# Patient Record
Sex: MALE | Race: OTHER
[De-identification: names, ages, dates, MRNs, and addresses within clinical notes are randomized per-mention and may not be internally consistent; named-entity substitution may affect disease eponyms.]

---

## 2018-04-13 ENCOUNTER — HOSPITAL ENCOUNTER (INPATIENT)
Dept: HOSPITAL 74 - YASAS | Age: 63
LOS: 5 days | Discharge: HOME | DRG: 773 | End: 2018-04-18
Attending: INTERNAL MEDICINE | Admitting: INTERNAL MEDICINE
Payer: SELF-PAY

## 2018-04-13 VITALS — BODY MASS INDEX: 46.9 KG/M2

## 2018-04-13 DIAGNOSIS — L97.919: ICD-10-CM

## 2018-04-13 DIAGNOSIS — G47.30: ICD-10-CM

## 2018-04-13 DIAGNOSIS — F17.210: ICD-10-CM

## 2018-04-13 DIAGNOSIS — G89.29: ICD-10-CM

## 2018-04-13 DIAGNOSIS — M54.5: ICD-10-CM

## 2018-04-13 DIAGNOSIS — B18.2: ICD-10-CM

## 2018-04-13 DIAGNOSIS — E66.01: ICD-10-CM

## 2018-04-13 DIAGNOSIS — L97.929: ICD-10-CM

## 2018-04-13 DIAGNOSIS — F19.24: ICD-10-CM

## 2018-04-13 DIAGNOSIS — F51.05: ICD-10-CM

## 2018-04-13 DIAGNOSIS — F11.23: Primary | ICD-10-CM

## 2018-04-13 LAB
APPEARANCE UR: CLEAR
BACTERIA #/AREA URNS HPF: (no result) /HPF
BILIRUB UR STRIP.AUTO-MCNC: 2 MG/DL
COLOR UR: (no result)
EPITH CASTS URNS QL MICRO: (no result) /HPF
GRAN CASTS URNS QL MICRO: 1 /LPF
HYALINE CASTS URNS QL MICRO: 11 /LPF
KETONES UR QL STRIP: NEGATIVE
LEUKOCYTE ESTERASE UR QL STRIP.AUTO: NEGATIVE
MUCOUS THREADS URNS QL MICRO: (no result)
NITRITE UR QL STRIP: NEGATIVE
PH UR: 5 [PH] (ref 5–8)
PROT UR QL STRIP: (no result)
PROT UR QL STRIP: NEGATIVE
RBC # UR STRIP: (no result) /UL
SP GR UR: 1.03 (ref 1–1.03)
UROBILINOGEN UR STRIP-MCNC: (no result) MG/DL (ref 0.2–1)

## 2018-04-13 PROCEDURE — HZ2ZZZZ DETOXIFICATION SERVICES FOR SUBSTANCE ABUSE TREATMENT: ICD-10-PCS | Performed by: INTERNAL MEDICINE

## 2018-04-13 RX ADMIN — ATORVASTATIN CALCIUM SCH MG: 10 TABLET, FILM COATED ORAL at 22:46

## 2018-04-13 RX ADMIN — AMLODIPINE BESYLATE SCH MG: 10 TABLET ORAL at 16:19

## 2018-04-13 RX ADMIN — Medication SCH MG: at 22:45

## 2018-04-13 RX ADMIN — NICOTINE SCH MG: 21 PATCH TRANSDERMAL at 16:19

## 2018-04-13 RX ADMIN — HYDROCHLOROTHIAZIDE SCH MG: 25 TABLET ORAL at 16:14

## 2018-04-13 RX ADMIN — Medication PRN MG: at 22:45

## 2018-04-13 RX ADMIN — SILVER SULFADIAZINE SCH APPLIC: 10 CREAM TOPICAL at 22:46

## 2018-04-13 NOTE — HP
COWS





- Scale


Resting Pulse: 1= RI 


Sweatin=Flushed/Facial Moisture


Restless Observation: 3= Extraneous Movement


Pupil Size: 2= Moderately Dilated


Bone or Joint Aches: 2= Severe Diffuse Aches


Runny Nose/ Eye Tearin= Runny Nose/Eyes


GI Upset > 30mins: 3= Vomiting/Diarrhea


Tremor Observation: 2= Slight Tremor Visible


Yawning Observation: 2= >3x During Session


Anxiety or Irritability: 2=Irritable/Anxious


Goose Flesh Skin: 0=Smooth Skin


COWS Score: 21





Admission ROS S





- HPI


Chief Complaint: 





I NEED HELP TO STOP USING HEROIN


Allergies/Adverse Reactions: 


 Allergies











Allergy/AdvReac Type Severity Reaction Status Date / Time


 


bee venom protein (honey bee) Allergy Severe Swelling Verified 18 13:59


 


penicillin G Allergy Severe Swelling Verified 18 13:59











History of Present Illness: 





THIS 62 YEARS OLD MALE WITH HEROIN DEPENDENCE,SEEKING DETOX,WITHDRAWAL SYMPTOM,

LAST DETOX  [facility]


HYPERTENSION NON COMPLIANCE LAST MEDICATION 1 MONTH AGO


MORBID OBESITY


CHRONIC EDEMA BOTH LEGS WITH ULCERS FOR 1 MONTH


SLEEP APNEA


DEPRESSION,INSOMNIA


LONGEST PERIOD OF SOBRIETY 15 YEARS


Exam Limitations: No Limitations





- Ebola screening


Have you traveled outside of the country in the last 21 days: No (N)


Have you had contact with anyone from an Ebola affected area: No


Have you been sick,other than usual withdrawal symptoms: No


Do you have a fever: No





- Review of Systems


Constitutional: Chills, Loss of Appetite, Malaise, Night Sweats, Changes in 

sleep, Weakness


EENT: reports: Tearing, Nose Congestion


Respiratory: reports: No Symptoms reported


Cardiac: reports: Palpitations


GI: reports: Diarrhea, Nausea, Vomiting, Abdominal cramping


: reports: No Symptoms Reported


Musculoskeletal: reports: Back Pain, Joint Pain, Muscle Pain, Joint Stiffness


Integumentary: reports: Dryness


Neuro: reports: Headache, Tremors


Endocrine: reports: No Symptoms Reported


Hematology: reports: No Symptoms Reported


Psychiatric: reports: No Sypmtoms Reported, Judgement Intact, Mood/Affect 

Appropiate, Orientated x3, Anxious, Depressed





Patient History





- Patient Medical History


Hx Anemia: No


Hx Asthma: No


Hx Chronic Obstructive Pulmonary Disease (COPD): No


Hx Cancer: No


Hx Cardiac Disorders: No


Hx Congestive Heart Failure: No


Hx Hypertension: Yes (NON COMPLIANCE)


Hx Hypercholesterolemia: No


Hx Pacemaker: No


HX Cerebrovascular Accident: No


Hx Seizures: No


Hx Diabetes: No


Hx Gastrointestinal Disorders: No


Hx Liver Disease: Yes (HEPATITIS C)


Hx Genitourinary Disorders: No


Hx Sexually Transmitted Disorders: No


Hx Renal Disease (ESRD): No


Hx Thyroid Disease: No


Hx Human Immunodeficiency Virus (HIV): No (LAST 2017 NEGATIVE)


Hx Hepatitis C: Yes


Hx Depression: No


Hx Suicide Attempt: No


Hx Bipolar Disorder: No


Hx Schizophrenia: No


Other Medical History: ANXIETY,DEPRESSION,INSOMNIA,CHRONIC EDEMA BOTH LEGS WITH 

STASIS ULCER,





- Patient Surgical History


Past Surgical History: No


Hx Neurologic Surgery: No


Hx Cataract Extraction: No


Hx Cardiac Surgery: No


Hx Lung Surgery: No


Hx Breast Surgery: No


Hx Breast Biopsy: No


Hx Abdominal Surgery: No


Hx Appendectomy: No


Hx Cholecystectomy: No


Hx Genitourinary Surgery: No


Hx  Section: No


Hx Orthopedic Surgery: No


Anesthesia Reaction: No





- PPD History


Previous Implant?: Yes


Documented Results: Negative w/o proof


Implanted On Prior R Admission?: No


PPD to be Administered?: Yes





- Smoking Cessation


Smoking history: Current every day smoker


Have you smoked in the past 12 months: Yes


Aproximately how many cigarettes per day: 10


Hx Chewing Tobacco Use: No


Initiated information on smoking cessation: Yes


'Breaking Loose' booklet given: 18





- Substance & Tx. History


Hx Alcohol Use: No


Hx Substance Use: Yes


Substance Use Type: Heroin


Hx Substance Use Treatment: Yes ([address] [place] [address])





- Substances Abused


  ** Heroin


Route: Inhalation


Frequency: Daily


Amount used: 15


Age of first use: 19


Date of Last Use: 18





Family Disease History





- Family Disease History


Family Disease History: Diabetes: Grandparent, Mother, Other: Father





Admission Physical Exam BHS





- Vital Signs


Vital Signs: 


 Vital Signs - 24 hr











  18





  11:55


 


Temperature 95.6 F L


 


Pulse Rate 92 H


 


Respiratory 20





Rate 


 


Blood Pressure 144/89














- Physical


General Appearance: Yes: Moderate Distress, Tremorous, Irritable, Sweating, 

Anxious


HEENTM: Yes: Normal ENT Inspection, Pharynx Normal


Respiratory: Yes: Within Normal Limits, Lungs Clear, No Respiratory Distress


Neck: Yes: Within Normal Limits, Supple, Trachea in good position


Breast: Yes: Within Normal Limits


Cardiology: Yes: Within Normal Limits, Regular Rhythm, Regular Rate, S1, S2


Abdominal: Yes: Within Normal Limits, Normal Bowel Sounds, Flat, Soft


Genitourinary: Yes: Within Normal Limits


Back: Yes: Muscle Spasm


Extremities: Yes: Within Normal Limits, Normal Range of Motion, Tremors, Other (

EDEMA BOTH LEGS CHRONIC BOTH LES WITH MULTILE SUPERFICIAL ULCERS DEMATITIS)


Neurological: Yes: CNs II-XII NML intact, Alert, Motor Strength 5/5, Normal Mood

/Affect


Integumentary: Yes: Dry


Lymphatic: Yes: Within Normal Limits





- Diagnostic


(1) Opioid dependence with withdrawal


Current Visit: Yes   Status: Acute   





(2) Essential hypertension


Current Visit: Yes   Status: Acute   





(3) Chronic low back pain


Current Visit: Yes   Status: Acute   





(4) Stasis leg ulcer


Current Visit: Yes   Status: Acute   


Qualifiers: 


   Laterality: bilateral   Qualified Code(s): I83.019 - Varicose veins of right 

lower extremity with ulcer of unspecified site; I83.029 - Varicose veins of 

left lower extremity with ulcer of unspecified site; L97.929 - Non-pressure 

chronic ulcer of unspecified part of left lower leg with unspecified severity; 

L97.929 - Non-pressure chronic ulcer of unspecified part of left lower leg with 

unspecified severity; I83.029 - Varicose veins of left lower extremity with 

ulcer of unspecified site; I83.029 - Varicose veins of left lower extremity 

with ulcer of unspecified site; L97.919 - Non-pressure chronic ulcer of 

unspecified part of right lower leg with unspecified severity; L97.919 - Non-

pressure chronic ulcer of unspecified part of right lower leg with unspecified 

severity; L97.919 - Non-pressure chronic ulcer of unspecified part of right 

lower leg with unspecified severity; L97.919 - Non-pressure chronic ulcer of 

unspecified part of right lower leg with unspecified severity; L97.929 - Non-

pressure chronic ulcer of unspecified part of left lower leg with unspecified 

severity; L97.929 - Non-pressure chronic ulcer of unspecified part of left 

lower leg with unspecified severity   





(5) Obesity


Current Visit: Yes   Status: Chronic   


Qualifiers: 


   Obesity type: due to excess calories   Obesity classification: adult class 3 

(BMI >= 40)   Serious obesity comorbidity presence: without serious comorbidity

   Body mass index: BMI 45.0-49.9   Qualified Code(s): E66.01 - Morbid (severe) 

obesity due to excess calories; Z68.42 - Body mass index (BMI) 45.0-49.9, adult

; Z68.42 - Body mass index (BMI) 45.0-49.9, adult; Z68.42 - Body mass index (BMI

) 45.0-49.9, adult; Z68.42 - Body mass index (BMI) 45.0-49.9, adult   





(6) Sleep apnea


Current Visit: Yes   Status: Acute   


Qualifiers: 


   Sleep apnea type: unspecified type   Qualified Code(s): G47.30 - Sleep apnea

, unspecified   





(7) Nicotine dependence


Current Visit: Yes   Status: Acute   


Qualifiers: 


   Nicotine product type: cigarettes   Substance use status: uncomplicated   

Qualified Code(s): F17.210 - Nicotine dependence, cigarettes, uncomplicated   





Cleared for Admission [facility]





- Detox or Rehab


[facility] Level of Care: Medically Managed


Detox Regimen/Protocol: Methadone





[facility] Breath Alcohol Content


Breath Alcohol Content: 0





Urine Drug Screen





- Results


Drug Screen Negative: No


Urine Drug Screen Results: OPI-Opiates, OXY-Oxycodone

## 2018-04-13 NOTE — CONSULT
[facility] Psychiatric Consult





- Data


Date of interview: 04/13/18


Admission source: [facility]


Identifying data: First admission to [facility] for this 61 y/o AA male seeking 

detox treatment on [address] for heroin dependence.Patient is a ,father of 

one,domiciled and currently employed (part-time).


Substance Abuse History: Confirmed by patient in this session.Details in 

current BHS report : Smoking history: Current every day smoker.  Have you 

smoked in the past 12 months: Yes.  Aproximately how many cigarettes per day: 

10.  Hx Chewing Tobacco Use: No.  Initiated information on smoking cessation: 

Yes.  'Breaking Loose' booklet given: 04/13/18.  - Substance & Tx. History.  Hx 

Alcohol Use: No.  Hx Substance Use: Yes.  Substance Use Type: Heroin.  Hx 

Substance Use Treatment: Yes ([address] [place] [address]).  - Substances Abused.  ** Heroin.

  Route: Inhalation.  Frequency: Daily.  Amount used: 15.  Age of first use: 

19.  Date of Last Use: 04/13/18


Medical History: Morbid obesity,hypertension,hepatitis C,sleep apnea,chronic 

back pain and bilateral leg edema (stasis ulcers).


Psychiatric History: No reported history of psychiatric 

hospitalizations.Diagnosed with MDD years ago (after the death of his wife).Not 

on psychotropic medications.Mr Kim states that he dropped out of outpatient 

psychiatric care several years ago.Denies history of suicide attempts.


Physical/Sexual Abuse/Trauma History: Traumatized by wife's death years 

ago.Couple has been together for 40 years.


Additional Comment: Urine Drug Screen Results: OPI-Opiates, OXY-Oxycodone.Noted.





Mental Status Exam





- Mental Status Exam


Alert and Oriented to: Time, Place


Cognitive Function: Good


Patient Appearance: Unkempt, Disheveled (morbidly obese,edematous legs covered 

with ulcers)


Mood: Nervous, Withdrawn, Anxious


Affect: Mood Congruent, Constricted


Patient Behavior: Fatigued, Appropriate, Cooperative


Speech Pattern: Clear, Appropriate


Voice Loudness: Normal


Thought Process: Intact, Goal Oriented


Thought Disorder: Not Present


Hallucinations: Denies


Suicidal Ideation: Denies


Homicidal Ideation: Denies


Insight/Judgement: Fair


Sleep: Well


Appetite: Good


Gait/Station: Normal (slow gait)





Psychiatric Findings





- Problem List (Axis 1, 2,3)


(1) Opioid dependence with withdrawal


Current Visit: Yes   Status: Acute   





(2) Nicotine dependence


Current Visit: Yes   Status: Acute   





(3) Substance induced mood disorder


Current Visit: Yes   Status: Acute   





- Initial Treatment Plan


Initial Treatment Plan: Psychoeducation.Detoxification in progress.Orientation 

to the unit.Support.Observation.

## 2018-04-14 LAB
ALBUMIN SERPL-MCNC: 3.7 G/DL (ref 3.4–5)
ALP SERPL-CCNC: 79 U/L (ref 45–117)
ALT SERPL-CCNC: 18 U/L (ref 12–78)
ANION GAP SERPL CALC-SCNC: 6 MMOL/L (ref 8–16)
AST SERPL-CCNC: 34 U/L (ref 15–37)
BILIRUB SERPL-MCNC: 1.5 MG/DL (ref 0.2–1)
BUN SERPL-MCNC: 16 MG/DL (ref 7–18)
CALCIUM SERPL-MCNC: 9 MG/DL (ref 8.5–10.1)
CHLORIDE SERPL-SCNC: 106 MMOL/L (ref 98–107)
CO2 SERPL-SCNC: 28 MMOL/L (ref 21–32)
CREAT SERPL-MCNC: 1.4 MG/DL (ref 0.7–1.3)
DEPRECATED RDW RBC AUTO: 17.1 % (ref 11.9–15.9)
GLUCOSE SERPL-MCNC: 91 MG/DL (ref 74–106)
HCT VFR BLD CALC: 42.5 % (ref 35.4–49)
HGB BLD-MCNC: 13.9 GM/DL (ref 11.7–16.9)
MCH RBC QN AUTO: 26.8 PG (ref 25.7–33.7)
MCHC RBC AUTO-ENTMCNC: 32.7 G/DL (ref 32–35.9)
MCV RBC: 82.1 FL (ref 80–96)
PLATELET # BLD AUTO: 110 K/MM3 (ref 134–434)
PMV BLD: 9.1 FL (ref 7.5–11.1)
POTASSIUM SERPLBLD-SCNC: 4.6 MMOL/L (ref 3.5–5.1)
PROT SERPL-MCNC: 7.1 G/DL (ref 6.4–8.2)
RBC # BLD AUTO: 5.17 M/MM3 (ref 4–5.6)
SICKLE CELL SCREEN: NEGATIVE
SODIUM SERPL-SCNC: 140 MMOL/L (ref 136–145)
WBC # BLD AUTO: 4.9 K/MM3 (ref 4–10)

## 2018-04-14 RX ADMIN — SILVER SULFADIAZINE SCH APPLIC: 10 CREAM TOPICAL at 22:52

## 2018-04-14 RX ADMIN — NICOTINE SCH MG: 21 PATCH TRANSDERMAL at 11:35

## 2018-04-14 RX ADMIN — Medication PRN MG: at 22:53

## 2018-04-14 RX ADMIN — AMLODIPINE BESYLATE SCH MG: 10 TABLET ORAL at 11:35

## 2018-04-14 RX ADMIN — ATORVASTATIN CALCIUM SCH MG: 10 TABLET, FILM COATED ORAL at 22:51

## 2018-04-14 RX ADMIN — Medication SCH TAB: at 11:35

## 2018-04-14 RX ADMIN — Medication SCH MG: at 22:51

## 2018-04-14 RX ADMIN — HYDROCHLOROTHIAZIDE SCH MG: 25 TABLET ORAL at 11:35

## 2018-04-14 RX ADMIN — SILVER SULFADIAZINE SCH APPLIC: 10 CREAM TOPICAL at 11:35

## 2018-04-14 NOTE — PN
BHS COWS





- Scale


Resting Pulse: 1= [place] 


Sweatin= Chills/Flushing


Restless Observation: 0= Sits Still


Pupil Size: 0= Normal to Room Light


Bone or Joint Aches: 4=Acute Joint/Muscle Pain


Runny Nose/ Eye Tearin= None


GI Upset > 30mins: 1= Stomach Cramp


Tremor Observation of Outstretched Hands: 0= None


Yawning Observation: 2= >3x During Session


Anxiety or Irritability: 2=Irritable/Anxious


Goose Flesh Skin: 3=Piloerection


COWS Score: 14





BHS Progress Note (SOAP)


Subjective: 





Stomach Cramping, Fatigue, Body Aches.


Objective: 


PATIENT A & O X 3. NO ACUTE DISTRESS.


PATIENT DENIES CHEST PAIN.





18 15:40


 Vital Signs











Temperature  95.8 F L  18 14:58


 


Pulse Rate  82   18 14:58


 


Respiratory Rate  20   18 14:58


 


Blood Pressure  136/84   18 14:58


 


O2 Sat by Pulse Oximetry (%)      








 Laboratory Tests











  18





  18:40 06:20 06:20


 


WBC   4.9 


 


RBC   5.17 


 


Hgb   13.9 


 


Hct   42.5 


 


MCV   82.1 


 


MCH   26.8 


 


MCHC   32.7 


 


RDW   17.1 H 


 


Plt Count   110 L 


 


MPV   9.1 


 


Manual Slide Review   No clumping seen 


 


Platelet Comment   Sl.dec 


 


Sickle Cell Screen   Negative 


 


Sodium    140


 


Potassium    4.6


 


Chloride    106


 


Carbon Dioxide    28


 


Anion Gap    6 L


 


BUN    16


 


Creatinine    1.4 H


 


Creat Clearance w eGFR    51.18


 


Random Glucose    91


 


Calcium    9.0


 


Total Bilirubin    1.5 H


 


AST    34


 


ALT    18


 


Alkaline Phosphatase    79


 


Total Protein    7.1


 


Albumin    3.7


 


Urine Color  Sada  


 


Urine Appearance  Clear  


 


Urine pH  5.0  


 


Ur Specific Gravity  1.028  


 


Urine Protein  3+ H  


 


Urine Glucose (UA)  Negative  


 


Urine Ketones  Negative  


 


Urine Blood  1+ H  


 


Urine Nitrite  Negative  


 


Urine Bilirubin  2.0  


 


Urine Urobilinogen  4.0 e.u/dl  


 


Ur Leukocyte Esterase  Negative  


 


Urine WBC (Auto)  2  


 


Urine RBC (Auto)  12  


 


Ur Epithelial Cells  Rare  


 


Urine Bacteria  Rare  


 


Hyaline Casts  11  


 


Granular Casts  1  


 


Urine Mucus  Many  


 


RPR Titer   














  18





  06:20


 


WBC 


 


RBC 


 


Hgb 


 


Hct 


 


MCV 


 


MCH 


 


MCHC 


 


RDW 


 


Plt Count 


 


MPV 


 


Manual Slide Review 


 


Platelet Comment 


 


Sickle Cell Screen 


 


Sodium 


 


Potassium 


 


Chloride 


 


Carbon Dioxide 


 


Anion Gap 


 


BUN 


 


Creatinine 


 


Creat Clearance w eGFR 


 


Random Glucose 


 


Calcium 


 


Total Bilirubin 


 


AST 


 


ALT 


 


Alkaline Phosphatase 


 


Total Protein 


 


Albumin 


 


Urine Color 


 


Urine Appearance 


 


Urine pH 


 


Ur Specific Gravity 


 


Urine Protein 


 


Urine Glucose (UA) 


 


Urine Ketones 


 


Urine Blood 


 


Urine Nitrite 


 


Urine Bilirubin 


 


Urine Urobilinogen 


 


Ur Leukocyte Esterase 


 


Urine WBC (Auto) 


 


Urine RBC (Auto) 


 


Ur Epithelial Cells 


 


Urine Bacteria 


 


Hyaline Casts 


 


Granular Casts 


 


Urine Mucus 


 


RPR Titer  Nonreactive








LABS NOTED.


Assessment: 





18 15:40


WITHDRAWAL SYMPTOMS.


Plan: 





CONTINUE DETOX.


D/C IBUPROFEN AND -MAGNESIUM-CONTAINING MEDS.

## 2018-04-15 RX ADMIN — HYDROCHLOROTHIAZIDE SCH MG: 25 TABLET ORAL at 10:50

## 2018-04-15 RX ADMIN — AMLODIPINE BESYLATE SCH MG: 10 TABLET ORAL at 10:50

## 2018-04-15 RX ADMIN — ATORVASTATIN CALCIUM SCH MG: 10 TABLET, FILM COATED ORAL at 21:54

## 2018-04-15 RX ADMIN — LISINOPRIL SCH MG: 5 TABLET ORAL at 22:59

## 2018-04-15 RX ADMIN — SILVER SULFADIAZINE SCH APPLIC: 10 CREAM TOPICAL at 10:50

## 2018-04-15 RX ADMIN — Medication SCH MG: at 21:54

## 2018-04-15 RX ADMIN — SILVER SULFADIAZINE SCH: 10 CREAM TOPICAL at 23:07

## 2018-04-15 RX ADMIN — Medication SCH TAB: at 10:50

## 2018-04-15 RX ADMIN — NICOTINE SCH MG: 21 PATCH TRANSDERMAL at 10:50

## 2018-04-15 NOTE — PN
BHS COWS





- Scale


Resting Pulse: 1= IN 


Sweatin= Chills/Flushing


Restless Observation: 1= Difficult to Sit Still


Pupil Size: 0= Normal to Room Light


Bone or Joint Aches: 2= Severe Diffuse Aches


Runny Nose/ Eye Tearin= Nasal Congestion


GI Upset > 30mins: 2= Nausea/Diarrhea


Tremor Observation of Outstretched Hands: 2= Slight Tremor Visible


Yawning Observation: 1= 1-2x During Session


Anxiety or Irritability: 2=Irritable/Anxious


Goose Flesh Skin: 3=Piloerection


COWS Score: 16





BHS Progress Note (SOAP)


Subjective: 





nausea/vomiting 


sweats


chills


interrupted sleep


Objective: 





04/15/18 11:04


 Vital Signs











Temperature  95.4 F L  04/15/18 11:00


 


Pulse Rate  89   04/15/18 11:00


 


Respiratory Rate  20   04/15/18 11:00


 


Blood Pressure  156/93   04/15/18 11:00


 


O2 Sat by Pulse Oximetry (%)      








 Laboratory Tests











  18





  18:40 06:20 06:20


 


WBC   4.9 


 


RBC   5.17 


 


Hgb   13.9 


 


Hct   42.5 


 


MCV   82.1 


 


MCH   26.8 


 


MCHC   32.7 


 


RDW   17.1 H 


 


Plt Count   110 L 


 


MPV   9.1 


 


Manual Slide Review   No clumping seen 


 


Platelet Comment   Sl.dec 


 


Sickle Cell Screen   Negative 


 


Sodium    140


 


Potassium    4.6


 


Chloride    106


 


Carbon Dioxide    28


 


Anion Gap    6 L


 


BUN    16


 


Creatinine    1.4 H


 


Creat Clearance w eGFR    51.18


 


Random Glucose    91


 


Calcium    9.0


 


Total Bilirubin    1.5 H


 


AST    34


 


ALT    18


 


Alkaline Phosphatase    79


 


Total Protein    7.1


 


Albumin    3.7


 


Urine Color  Sada  


 


Urine Appearance  Clear  


 


Urine pH  5.0  


 


Ur Specific Gravity  1.028  


 


Urine Protein  3+ H  


 


Urine Glucose (UA)  Negative  


 


Urine Ketones  Negative  


 


Urine Blood  1+ H  


 


Urine Nitrite  Negative  


 


Urine Bilirubin  2.0  


 


Urine Urobilinogen  4.0 e.u/dl  


 


Ur Leukocyte Esterase  Negative  


 


Urine WBC (Auto)  2  


 


Urine RBC (Auto)  12  


 


Ur Epithelial Cells  Rare  


 


Urine Bacteria  Rare  


 


Hyaline Casts  11  


 


Granular Casts  1  


 


Urine Mucus  Many  


 


RPR Titer   














  18





  06:20


 


WBC 


 


RBC 


 


Hgb 


 


Hct 


 


MCV 


 


MCH 


 


MCHC 


 


RDW 


 


Plt Count 


 


MPV 


 


Manual Slide Review 


 


Platelet Comment 


 


Sickle Cell Screen 


 


Sodium 


 


Potassium 


 


Chloride 


 


Carbon Dioxide 


 


Anion Gap 


 


BUN 


 


Creatinine 


 


Creat Clearance w eGFR 


 


Random Glucose 


 


Calcium 


 


Total Bilirubin 


 


AST 


 


ALT 


 


Alkaline Phosphatase 


 


Total Protein 


 


Albumin 


 


Urine Color 


 


Urine Appearance 


 


Urine pH 


 


Ur Specific Gravity 


 


Urine Protein 


 


Urine Glucose (UA) 


 


Urine Ketones 


 


Urine Blood 


 


Urine Nitrite 


 


Urine Bilirubin 


 


Urine Urobilinogen 


 


Ur Leukocyte Esterase 


 


Urine WBC (Auto) 


 


Urine RBC (Auto) 


 


Ur Epithelial Cells 


 


Urine Bacteria 


 


Hyaline Casts 


 


Granular Casts 


 


Urine Mucus 


 


RPR Titer  Nonreactive








aaox3


ambulating


no acute distress


Assessment: 





04/15/18 11:04


withdrawal sx


Plan: 





continue detox


increase fluids


tigan IM/PO prn

## 2018-04-15 NOTE — PN
BHS Progress Note


Note: 





Patient complained of nausea. Denies vomiting at this time. Ondansetron 4mg 

subligual given

## 2018-04-16 RX ADMIN — AMLODIPINE BESYLATE SCH MG: 10 TABLET ORAL at 15:01

## 2018-04-16 RX ADMIN — LISINOPRIL SCH MG: 5 TABLET ORAL at 22:25

## 2018-04-16 RX ADMIN — ATORVASTATIN CALCIUM SCH MG: 10 TABLET, FILM COATED ORAL at 22:25

## 2018-04-16 RX ADMIN — Medication PRN MG: at 22:29

## 2018-04-16 RX ADMIN — SILVER SULFADIAZINE SCH: 10 CREAM TOPICAL at 21:47

## 2018-04-16 RX ADMIN — NICOTINE SCH MG: 21 PATCH TRANSDERMAL at 12:32

## 2018-04-16 RX ADMIN — Medication SCH TAB: at 12:28

## 2018-04-16 RX ADMIN — Medication SCH MG: at 22:25

## 2018-04-16 RX ADMIN — SILVER SULFADIAZINE SCH APPLIC: 10 CREAM TOPICAL at 12:33

## 2018-04-16 RX ADMIN — HYDROCHLOROTHIAZIDE SCH MG: 25 TABLET ORAL at 12:25

## 2018-04-16 NOTE — PN
BHS Progress Note


Note: 





 Vital Signs











Temperature  97 F L  04/16/18 14:01


 


Pulse Rate  90   04/16/18 14:01


 


Respiratory Rate  20   04/16/18 14:01


 


Blood Pressure  166/103   04/16/18 14:01


 


O2 Sat by Pulse Oximetry (%)      








Patient with symptomatic elevated BP 





one time dose clonidine 0.1mg STAT 


Increase fluids 


Continue to monitor

## 2018-04-16 NOTE — EKG
Test Reason : 

Blood Pressure : ***/*** mmHG

Vent. Rate : 085 BPM     Atrial Rate : 085 BPM

   P-R Int : 142 ms          QRS Dur : 086 ms

    QT Int : 372 ms       P-R-T Axes : 040 011 217 degrees

   QTc Int : 442 ms

 

NORMAL SINUS RHYTHM

NONSPECIFIC T WAVE ABNORMALITY

ABNORMAL ECG

NO PREVIOUS ECGS AVAILABLE

Confirmed by DERREK GARCÍA MD ([id number]) on 4/16/2018 12:43:28 PM

 

Referred By:             Confirmed By:DERREK GARCÍA MD

## 2018-04-16 NOTE — PN
BHS Progress Note (SOAP)


Subjective: 





Fatigue, Body Aches, Interrupted Sleep, Vomiting.


Objective: 


PATIENT A & O X 3. NO ACUTE DISTRESS.





04/16/18 13:29


 Vital Signs











Temperature  96.3 F L  04/16/18 09:24


 


Pulse Rate  91 H  04/16/18 09:24


 


Respiratory Rate  18   04/16/18 09:24


 


Blood Pressure  166/107   04/16/18 09:24


 


O2 Sat by Pulse Oximetry (%)      








 Laboratory Tests











  04/13/18 04/14/18 04/14/18





  18:40 06:20 06:20


 


WBC   4.9 


 


RBC   5.17 


 


Hgb   13.9 


 


Hct   42.5 


 


MCV   82.1 


 


MCH   26.8 


 


MCHC   32.7 


 


RDW   17.1 H 


 


Plt Count   110 L 


 


MPV   9.1 


 


Manual Slide Review   No clumping seen 


 


Platelet Comment   Sl.dec 


 


Sickle Cell Screen   Negative 


 


Sodium    140


 


Potassium    4.6


 


Chloride    106


 


Carbon Dioxide    28


 


Anion Gap    6 L


 


BUN    16


 


Creatinine    1.4 H


 


Creat Clearance w eGFR    51.18


 


Random Glucose    91


 


Calcium    9.0


 


Total Bilirubin    1.5 H


 


AST    34


 


ALT    18


 


Alkaline Phosphatase    79


 


Total Protein    7.1


 


Albumin    3.7


 


Urine Color  Sada  


 


Urine Appearance  Clear  


 


Urine pH  5.0  


 


Ur Specific Gravity  1.028  


 


Urine Protein  3+ H  


 


Urine Glucose (UA)  Negative  


 


Urine Ketones  Negative  


 


Urine Blood  1+ H  


 


Urine Nitrite  Negative  


 


Urine Bilirubin  2.0  


 


Urine Urobilinogen  4.0 e.u/dl  


 


Ur Leukocyte Esterase  Negative  


 


Urine WBC (Auto)  2  


 


Urine RBC (Auto)  12  


 


Ur Epithelial Cells  Rare  


 


Urine Bacteria  Rare  


 


Hyaline Casts  11  


 


Granular Casts  1  


 


Urine Mucus  Many  


 


RPR Titer   


 


HIV 1&2 Antibody Screen   


 


HIV P24 Antigen   














  04/14/18 04/15/18





  06:20 08:00


 


WBC  


 


RBC  


 


Hgb  


 


Hct  


 


MCV  


 


MCH  


 


MCHC  


 


RDW  


 


Plt Count  


 


MPV  


 


Manual Slide Review  


 


Platelet Comment  


 


Sickle Cell Screen  


 


Sodium  


 


Potassium  


 


Chloride  


 


Carbon Dioxide  


 


Anion Gap  


 


BUN  


 


Creatinine  


 


Creat Clearance w eGFR  


 


Random Glucose  


 


Calcium  


 


Total Bilirubin  


 


AST  


 


ALT  


 


Alkaline Phosphatase  


 


Total Protein  


 


Albumin  


 


Urine Color  


 


Urine Appearance  


 


Urine pH  


 


Ur Specific Gravity  


 


Urine Protein  


 


Urine Glucose (UA)  


 


Urine Ketones  


 


Urine Blood  


 


Urine Nitrite  


 


Urine Bilirubin  


 


Urine Urobilinogen  


 


Ur Leukocyte Esterase  


 


Urine WBC (Auto)  


 


Urine RBC (Auto)  


 


Ur Epithelial Cells  


 


Urine Bacteria  


 


Hyaline Casts  


 


Granular Casts  


 


Urine Mucus  


 


RPR Titer  Nonreactive 


 


HIV 1&2 Antibody Screen   Negative


 


HIV P24 Antigen   Negative








LABS NOTED.


Assessment: 





04/16/18 13:30


WITHDRAWAL SYMPTOMS.


Plan: 





CONTINUE DETOX.


AMLODIPINE, 10 MG PO DAILY FOR ELEVATED BP (PATIENT WAS PRESCRIBED PRIOR TO 

DETOX ADMISSION).

## 2018-04-16 NOTE — EKG
Test Reason : 

Blood Pressure : ***/*** mmHG

Vent. Rate : 080 BPM     Atrial Rate : 080 BPM

   P-R Int : 144 ms          QRS Dur : 084 ms

    QT Int : 400 ms       P-R-T Axes : 040 -16 -16 degrees

   QTc Int : 461 ms

 

NORMAL SINUS RHYTHM

NONSPECIFIC T WAVE ABNORMALITY

PROLONGED QT

ABNORMAL ECG

WHEN COMPARED WITH ECG OF 13-APR-2018 17:03,

NO SIGNIFICANT CHANGE WAS FOUND

Confirmed by DERREK GARCÍA MD ([id number]) on 4/16/2018 12:42:01 PM

 

Referred By:             Confirmed By:DERREK GARCÍA MD

## 2018-04-17 RX ADMIN — NICOTINE SCH MG: 21 PATCH TRANSDERMAL at 10:37

## 2018-04-17 RX ADMIN — AMLODIPINE BESYLATE SCH MG: 10 TABLET ORAL at 10:38

## 2018-04-17 RX ADMIN — SILVER SULFADIAZINE SCH APPLIC: 10 CREAM TOPICAL at 23:17

## 2018-04-17 RX ADMIN — SILVER SULFADIAZINE SCH APPLIC: 10 CREAM TOPICAL at 10:37

## 2018-04-17 RX ADMIN — Medication SCH TAB: at 10:38

## 2018-04-17 RX ADMIN — Medication PRN MG: at 23:12

## 2018-04-17 RX ADMIN — HYDROCHLOROTHIAZIDE SCH MG: 25 TABLET ORAL at 10:38

## 2018-04-17 RX ADMIN — Medication SCH MG: at 23:12

## 2018-04-17 RX ADMIN — LISINOPRIL SCH MG: 5 TABLET ORAL at 23:12

## 2018-04-17 RX ADMIN — ATORVASTATIN CALCIUM SCH MG: 10 TABLET, FILM COATED ORAL at 23:12

## 2018-04-17 NOTE — PN
[facility] Progress Note (SOAP)


Subjective: 





Fatigue, Vomiting, Stomach Cramping, Body Aches.


Objective: 


PATIENT A & O X 3. NO ACUTE DISTRESS.





04/17/18 13:58


 Vital Signs











Temperature  96.3 F L  04/17/18 09:06


 


Pulse Rate  86   04/17/18 09:06


 


Respiratory Rate  18   04/17/18 09:06


 


Blood Pressure  156/101   04/17/18 09:06


 


O2 Sat by Pulse Oximetry (%)      








 Laboratory Tests











  04/13/18 04/14/18 04/14/18





  18:40 06:20 06:20


 


WBC   4.9 


 


RBC   5.17 


 


Hgb   13.9 


 


Hct   42.5 


 


MCV   82.1 


 


MCH   26.8 


 


MCHC   32.7 


 


RDW   17.1 H 


 


Plt Count   110 L 


 


MPV   9.1 


 


Manual Slide Review   No clumping seen 


 


Platelet Comment   Sl.dec 


 


Sickle Cell Screen   Negative 


 


Sodium    140


 


Potassium    4.6


 


Chloride    106


 


Carbon Dioxide    28


 


Anion Gap    6 L


 


BUN    16


 


Creatinine    1.4 H


 


Creat Clearance w eGFR    51.18


 


Random Glucose    91


 


Calcium    9.0


 


Total Bilirubin    1.5 H


 


AST    34


 


ALT    18


 


Alkaline Phosphatase    79


 


Total Protein    7.1


 


Albumin    3.7


 


Urine Color  Sada  


 


Urine Appearance  Clear  


 


Urine pH  5.0  


 


Ur Specific Gravity  1.028  


 


Urine Protein  3+ H  


 


Urine Glucose (UA)  Negative  


 


Urine Ketones  Negative  


 


Urine Blood  1+ H  


 


Urine Nitrite  Negative  


 


Urine Bilirubin  2.0  


 


Urine Urobilinogen  4.0 e.u/dl  


 


Ur Leukocyte Esterase  Negative  


 


Urine WBC (Auto)  2  


 


Urine RBC (Auto)  12  


 


Ur Epithelial Cells  Rare  


 


Urine Bacteria  Rare  


 


Hyaline Casts  11  


 


Granular Casts  1  


 


Urine Mucus  Many  


 


RPR Titer   


 


HIV 1&2 Antibody Screen   


 


HIV P24 Antigen   














  04/14/18 04/15/18





  06:20 08:00


 


WBC  


 


RBC  


 


Hgb  


 


Hct  


 


MCV  


 


MCH  


 


MCHC  


 


RDW  


 


Plt Count  


 


MPV  


 


Manual Slide Review  


 


Platelet Comment  


 


Sickle Cell Screen  


 


Sodium  


 


Potassium  


 


Chloride  


 


Carbon Dioxide  


 


Anion Gap  


 


BUN  


 


Creatinine  


 


Creat Clearance w eGFR  


 


Random Glucose  


 


Calcium  


 


Total Bilirubin  


 


AST  


 


ALT  


 


Alkaline Phosphatase  


 


Total Protein  


 


Albumin  


 


Urine Color  


 


Urine Appearance  


 


Urine pH  


 


Ur Specific Gravity  


 


Urine Protein  


 


Urine Glucose (UA)  


 


Urine Ketones  


 


Urine Blood  


 


Urine Nitrite  


 


Urine Bilirubin  


 


Urine Urobilinogen  


 


Ur Leukocyte Esterase  


 


Urine WBC (Auto)  


 


Urine RBC (Auto)  


 


Ur Epithelial Cells  


 


Urine Bacteria  


 


Hyaline Casts  


 


Granular Casts  


 


Urine Mucus  


 


RPR Titer  Nonreactive 


 


HIV 1&2 Antibody Screen   Negative


 


HIV P24 Antigen   Negative








LABS NOTED.


Assessment: 





04/17/18 13:59


WITHDRAWAL SYMPTOMS.


Plan: 





CONTINUE DETOX.


CLONIDINE, 0.2 MG PO X 1 FOR DETOX SYMPTOMS AND FOR ELEVATED BP.


PATIENT HAS DISPLAYED VERY LITTLE AMBULATION DURING THIS DETOX ADMISSION. 

PATIENT ENCOURAGED TO ATTEMPT AMBULATION AS TOLERATED AS HE PREPARES FOR 

DISCHARGE TOMORROW.


PATIENT ALSO ADVISED TO FOLLOW-UP WITH  DR. THOMAS ([place] 

AFTER DISCHARGE FROM DETOX FOR MEDICAL ASSESSMENT, FOR WOUNDS ON FEET, FOR 

HISTORY OF HYPERTENSION, AND FOR ABNORMAL RENAL VALUES DRAWN WHILE ADMITTED FOR 

DETOX.

## 2018-04-18 VITALS — DIASTOLIC BLOOD PRESSURE: 78 MMHG | HEART RATE: 78 BPM | TEMPERATURE: 96.3 F | SYSTOLIC BLOOD PRESSURE: 125 MMHG

## 2018-04-18 NOTE — PN
[facility]HS Progress Note (SOAP)


Subjective: 





Patient reports that he feels well overall.


Objective: 


PATIENT A & O X 3, OBSERVED AMBULATING ON UNIT. NO ACUTE DISTRESS.





04/18/18 15:01


 Vital Signs











Temperature  96.3 F L  04/18/18 09:05


 


Pulse Rate  78   04/18/18 09:05


 


Respiratory Rate  18   04/18/18 09:05


 


Blood Pressure  125/78   04/18/18 09:05


 


O2 Sat by Pulse Oximetry (%)      








 Laboratory Tests











  04/13/18 04/14/18 04/14/18





  18:40 06:20 06:20


 


WBC   4.9 


 


RBC   5.17 


 


Hgb   13.9 


 


Hct   42.5 


 


MCV   82.1 


 


MCH   26.8 


 


MCHC   32.7 


 


RDW   17.1 H 


 


Plt Count   110 L 


 


MPV   9.1 


 


Manual Slide Review   No clumping seen 


 


Platelet Comment   Sl.dec 


 


Sickle Cell Screen   Negative 


 


Sodium    140


 


Potassium    4.6


 


Chloride    106


 


Carbon Dioxide    28


 


Anion Gap    6 L


 


BUN    16


 


Creatinine    1.4 H


 


Creat Clearance w eGFR    51.18


 


Random Glucose    91


 


Calcium    9.0


 


Total Bilirubin    1.5 H


 


AST    34


 


ALT    18


 


Alkaline Phosphatase    79


 


Total Protein    7.1


 


Albumin    3.7


 


Urine Color  Sada  


 


Urine Appearance  Clear  


 


Urine pH  5.0  


 


Ur Specific Gravity  1.028  


 


Urine Protein  3+ H  


 


Urine Glucose (UA)  Negative  


 


Urine Ketones  Negative  


 


Urine Blood  1+ H  


 


Urine Nitrite  Negative  


 


Urine Bilirubin  2.0  


 


Urine Urobilinogen  4.0 e.u/dl  


 


Ur Leukocyte Esterase  Negative  


 


Urine WBC (Auto)  2  


 


Urine RBC (Auto)  12  


 


Ur Epithelial Cells  Rare  


 


Urine Bacteria  Rare  


 


Hyaline Casts  11  


 


Granular Casts  1  


 


Urine Mucus  Many  


 


RPR Titer   


 


HIV 1&2 Antibody Screen   


 


HIV P24 Antigen   














  04/14/18 04/15/18





  06:20 08:00


 


WBC  


 


RBC  


 


Hgb  


 


Hct  


 


MCV  


 


MCH  


 


MCHC  


 


RDW  


 


Plt Count  


 


MPV  


 


Manual Slide Review  


 


Platelet Comment  


 


Sickle Cell Screen  


 


Sodium  


 


Potassium  


 


Chloride  


 


Carbon Dioxide  


 


Anion Gap  


 


BUN  


 


Creatinine  


 


Creat Clearance w eGFR  


 


Random Glucose  


 


Calcium  


 


Total Bilirubin  


 


AST  


 


ALT  


 


Alkaline Phosphatase  


 


Total Protein  


 


Albumin  


 


Urine Color  


 


Urine Appearance  


 


Urine pH  


 


Ur Specific Gravity  


 


Urine Protein  


 


Urine Glucose (UA)  


 


Urine Ketones  


 


Urine Blood  


 


Urine Nitrite  


 


Urine Bilirubin  


 


Urine Urobilinogen  


 


Ur Leukocyte Esterase  


 


Urine WBC (Auto)  


 


Urine RBC (Auto)  


 


Ur Epithelial Cells  


 


Urine Bacteria  


 


Hyaline Casts  


 


Granular Casts  


 


Urine Mucus  


 


RPR Titer  Nonreactive 


 


HIV 1&2 Antibody Screen   Negative


 


HIV P24 Antigen   Negative








LABS NOTED.


Assessment: 





04/18/18 15:02


COMPLETION OF DETOX REGIMEN.


Plan: 





PATIENT SCHEDULED FOR DISCHARGE FROM DETOX UNIT TODAY.

## 2018-11-13 ENCOUNTER — HOSPITAL ENCOUNTER (INPATIENT)
Dept: HOSPITAL 74 - YASAS | Age: 63
LOS: 2 days | Discharge: ANOTHER HOSPITAL | DRG: 773 | End: 2018-11-15
Attending: INTERNAL MEDICINE | Admitting: INTERNAL MEDICINE
Payer: COMMERCIAL

## 2018-11-13 VITALS — BODY MASS INDEX: 44.3 KG/M2

## 2018-11-13 DIAGNOSIS — Z88.0: ICD-10-CM

## 2018-11-13 DIAGNOSIS — G89.29: ICD-10-CM

## 2018-11-13 DIAGNOSIS — M54.5: ICD-10-CM

## 2018-11-13 DIAGNOSIS — B18.2: ICD-10-CM

## 2018-11-13 DIAGNOSIS — F11.23: Primary | ICD-10-CM

## 2018-11-13 DIAGNOSIS — F17.210: ICD-10-CM

## 2018-11-13 DIAGNOSIS — I10: ICD-10-CM

## 2018-11-13 PROCEDURE — HZ2ZZZZ DETOXIFICATION SERVICES FOR SUBSTANCE ABUSE TREATMENT: ICD-10-PCS | Performed by: INTERNAL MEDICINE

## 2018-11-13 RX ADMIN — NICOTINE SCH MG: 7 PATCH TRANSDERMAL at 17:36

## 2018-11-13 RX ADMIN — HYDROCHLOROTHIAZIDE SCH MG: 12.5 CAPSULE ORAL at 17:36

## 2018-11-13 RX ADMIN — Medication SCH MG: at 22:42

## 2018-11-13 RX ADMIN — AMLODIPINE BESYLATE SCH MG: 10 TABLET ORAL at 17:36

## 2018-11-13 NOTE — HP
COWS





- Scale


Resting Pulse: 2= -120


Sweatin= No chills or Flushing


Restless Observation: 0= Sits Still


Pupil Size: 0= Normal to Room Light


Bone or Joint Aches: 2= Severe Diffuse Aches


Runny Nose/ Eye Tearin= Runny Nose/Eyes


GI Upset > 30mins: 0= None


Tremor Observation: 1= Tremor Felt, Not Seen


Yawning Observation: 0= None


Anxiety or Irritability: 0= None


Goose Flesh Skin: 0=Smooth Skin


COWS Score: 7





CIWA Score





- Admission Criteria


[facility] Guidelines: Admission for Medically Managed Detox: 


Requires at least one of the followin. CIWA greater than 12


2. Seizures within the past 24 hours


3. Delirium tremens within the past 24 hours


4. Hallucinations within the past 24 hours


5. Acute intervention needed for co  occurring medical disorder


6. Acute intervention needed for co  occurring psychiatric disorder


7. Severe withdrawal that cannot be handled at a lower level of care (continued


    vomiting, continued diarrhea, abnormal vital signs) requiring intravenous


    medication and/or fluids


8. Pregnancy








Admission ROS [facility]





- [facility]


Allergies/Adverse Reactions: 


 Allergies











Allergy/AdvReac Type Severity Reaction Status Date / Time


 


bee venom protein (honey bee) Allergy Severe Swelling Verified 18 12:01


 


penicillin G Allergy Severe Swelling Verified 18 12:01











History of Present Illness: 





pt here requesting detox from opiate use  , reports 4-5 bags/day via inhalation 

, denies Ivdu  currently + ivdu 20 years ago , first age of use since 1970's , 

most recent use yesterday 12 noon , current symptoms as above ,  in outpt 

program [facility] , referred  by counsellor  to this facility due to ongoing use  

. 


denies other  illicits or ETOH currently , in the past  reports use of  cocaine 

(25 years ago ).


tobacco : 1 ppd , requesting nrt w/ patch 


utox +  fen, opi, oxy , bzo 


caitlin  0.000  


pmhx : goes  to [facility] for swelling in legs , HTN , meds as  

below 


meds - brought in dated 8/15/18  HCTZ 12.5 mg Amlodipine 10 mg 


pshx : arm abscess ( many years ago )


psych : denies  


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 





- Ebola screening


Have you traveled outside of the country in the last 21 days: No


Have you had contact with anyone from an Ebola affected area: No


Have you been sick,other than usual withdrawal symptoms: No


Do you have a fever: No





- Review of Systems


Constitutional: See HPI


EENT: reports: See HPI, Other (glasses  - bifocals)


Respiratory: reports: No Symptoms reported


Cardiac: reports: No Symptoms Reported


GI: reports: No Symptoms Reported


: reports: No Symptoms Reported


Musculoskeletal: reports: Back Pain, Muscle Pain


Integumentary: reports: No Symptoms Reported


Neuro: reports: No Symptoms reported


Endocrine: reports: No Symptoms Reported


Psychiatric: reports: Judgement Intact, Orientated x3





Patient History





- Patient Medical History


Hx Anemia: No


Hx Asthma: No


Hx Chronic Obstructive Pulmonary Disease (COPD): No


Hx Cancer: No


Hx Cardiac Disorders: No


Hx Congestive Heart Failure: No


Hx Hypertension: Yes (On meds)


Hx Hypercholesterolemia: No


Hx Pacemaker: No


HX Cerebrovascular Accident: No


Hx Seizures: No


Hx Diabetes: No


Hx Gastrointestinal Disorders: No


Hx Liver Disease: Yes (HEPATITIS C)


Hx Genitourinary Disorders: No


Hx Sexually Transmitted Disorders: No


Hx Renal Disease (ESRD): No


Hx Thyroid Disease: No


Hx Human Immunodeficiency Virus (HIV): No (LAST 2017 NEGATIVE)


Hx Hepatitis C: Yes


Hx Depression: No


Hx Suicide Attempt: No


Hx Bipolar Disorder: No


Hx Schizophrenia: No





- Patient Surgical History


Past Surgical History: No


Hx Neurologic Surgery: No


Hx Cataract Extraction: No


Hx Cardiac Surgery: No


Hx Lung Surgery: No


Hx Breast Surgery: No


Hx Breast Biopsy: No


Hx Abdominal Surgery: No


Hx Appendectomy: No


Hx Cholecystectomy: No


Hx Genitourinary Surgery: No


Hx  Section: No


Hx Orthopedic Surgery: No


Anesthesia Reaction: No





- PPD History


Previous Implant?: Yes


Documented Results: Negative w/proof


Date: 04/15/18





- Smoking Cessation


Smoking history: Current every day smoker


Have you smoked in the past 12 months: Yes


Aproximately how many cigarettes per day: 10


Hx Chewing Tobacco Use: No


Initiated information on smoking cessation: No





- Substances Abused


  ** Heroin


Route: Inhalation


Frequency: 3-6 times per week


Amount used: 4-5 bags


Age of first use: 27


Date of Last Use: 17





Family Disease History





- Family Disease History


Family Disease History: Diabetes: Grandparent, Mother, Other: Father





Admission Physical Exam B[facility]





- Vital Signs


Vital Signs: 


 Vital Signs - 24 hr











  18





  11:40


 


Temperature 97.2 F L


 


Pulse Rate 102 H


 


Respiratory 18





Rate 


 


Blood Pressure 165/102 H














- Physical


General Appearance: Yes: Disheveled, Mild Distress


HEENTM: Yes: EOMI, Hearing grossly Normal, Normal ENT Inspection, Normocephalic

, Normal Voice, Pharynx Normal, Nasal Congestion, Rhinorrhea, Other (poor 

dentition, many missing teeth)


Respiratory: Yes: Chest Non-Tender, Lungs Clear, Normal Breath Sounds


Neck: Yes: No masses,lesions,Nodules, Trachea in good position


Breast: Yes: Breast Exam Deferred


Cardiology: Yes: Regular Rhythm, Regular Rate, Tachycardia


Abdominal: Yes: Normal Bowel Sounds, Non Tender, Protuberent


Genitourinary: Yes: Within Normal Limits


Back: Yes: Normal Inspection


Musculoskeletal: Yes: full range of Motion, Gait Steady, Back pain, Muscle Pain


Extremities: Yes: Swelling, Erythema, Inflammation, Other (severe edema 

bilateral LE  w/ induration , dry skin , hyperkeratotic plaques . No open areas

  noted , patient states  swelling is better than  since he was at [facility], however admits to not taking BP meds regularly  . " it gets better 

when I put my feet up  , I haven't been doing that " . No pain , negative Homans

'.)


Neurological: Yes: Motor Strength 5/5, Normal Mood/Affect, Normal Response


Integumentary: Yes: Normal Color, Dry, Warm





- Diagnostic


(1) Chronic low back pain


Current Visit: No   Status: Chronic   


Qualifiers: 


   Back pain laterality: unspecified   Sciatica presence: unspecified whether 

sciatica present   Qualified Code(s): M54.5 - Low back pain; G89.29 - Other 

chronic pain; G89.29 - Other chronic pain   





(2) Essential hypertension


Current Visit: No   Status: Chronic   





(3) Nicotine dependence


Current Visit: No   Status: Acute   


Qualifiers: 


   Nicotine product type: cigarettes   Substance use status: uncomplicated   

Qualified Code(s): F17.210 - Nicotine dependence, cigarettes, uncomplicated   





(4) Opioid dependence with withdrawal


Current Visit: No   Status: Acute   





BHS Breath Alcohol Content


Breath Alcohol Content: 0





Urine Drug Screen





- Results


Drug Screen Negative: No


Urine Drug Screen Results: OPI-Opiates, BZO-Benzodiazepines, OXY-Oxycodone, FEN-

Fentanyl

## 2018-11-14 LAB
ALBUMIN SERPL-MCNC: 3.5 G/DL (ref 3.4–5)
ALP SERPL-CCNC: 84 U/L (ref 45–117)
ALT SERPL-CCNC: 37 U/L (ref 13–61)
ANION GAP SERPL CALC-SCNC: 10 MMOL/L (ref 8–16)
APPEARANCE UR: CLEAR
AST SERPL-CCNC: 45 U/L (ref 15–37)
BILIRUB SERPL-MCNC: 1.1 MG/DL (ref 0.2–1)
BILIRUB UR STRIP.AUTO-MCNC: NEGATIVE MG/DL
BUN SERPL-MCNC: 10 MG/DL (ref 7–18)
CALCIUM SERPL-MCNC: 9.1 MG/DL (ref 8.5–10.1)
CHLORIDE SERPL-SCNC: 105 MMOL/L (ref 98–107)
CO2 SERPL-SCNC: 27 MMOL/L (ref 21–32)
COLOR UR: YELLOW
CREAT SERPL-MCNC: 1.1 MG/DL (ref 0.55–1.3)
DEPRECATED RDW RBC AUTO: 15.6 % (ref 11.9–15.9)
GLUCOSE SERPL-MCNC: 91 MG/DL (ref 74–106)
HCT VFR BLD CALC: 46.9 % (ref 35.4–49)
HGB BLD-MCNC: 15.2 GM/DL (ref 11.7–16.9)
KETONES UR QL STRIP: NEGATIVE
LEUKOCYTE ESTERASE UR QL STRIP.AUTO: NEGATIVE
MCH RBC QN AUTO: 27.7 PG (ref 25.7–33.7)
MCHC RBC AUTO-ENTMCNC: 32.5 G/DL (ref 32–35.9)
MCV RBC: 85.3 FL (ref 80–96)
NITRITE UR QL STRIP: NEGATIVE
PH UR: 5 [PH] (ref 5–8)
PLATELET # BLD AUTO: 132 K/MM3 (ref 134–434)
PMV BLD: 7.8 FL (ref 7.5–11.1)
POTASSIUM SERPLBLD-SCNC: 4.5 MMOL/L (ref 3.5–5.1)
PROT SERPL-MCNC: 6.9 G/DL (ref 6.4–8.2)
PROT UR QL STRIP: NEGATIVE
PROT UR QL STRIP: NEGATIVE
RBC # BLD AUTO: 5.5 M/MM3 (ref 4–5.6)
SODIUM SERPL-SCNC: 142 MMOL/L (ref 136–145)
SP GR UR: 1.02 (ref 1.01–1.03)
UROBILINOGEN UR STRIP-MCNC: NEGATIVE MG/DL (ref 0.2–1)
WBC # BLD AUTO: 5.4 K/MM3 (ref 4–10)

## 2018-11-14 RX ADMIN — AMLODIPINE BESYLATE SCH MG: 10 TABLET ORAL at 10:24

## 2018-11-14 RX ADMIN — ONDANSETRON PRN MG: 4 TABLET, ORALLY DISINTEGRATING ORAL at 11:40

## 2018-11-14 RX ADMIN — NICOTINE SCH MG: 7 PATCH TRANSDERMAL at 10:24

## 2018-11-14 RX ADMIN — Medication SCH TAB: at 10:24

## 2018-11-14 RX ADMIN — Medication SCH MG: at 21:43

## 2018-11-14 RX ADMIN — HYDROCHLOROTHIAZIDE SCH MG: 12.5 CAPSULE ORAL at 10:24

## 2018-11-14 RX ADMIN — ONDANSETRON PRN MG: 4 TABLET, ORALLY DISINTEGRATING ORAL at 21:48

## 2018-11-14 NOTE — PN
BHS COWS





- Scale


Resting Pulse: 1= [place] 


Sweatin= Chills/Flushing


Restless Observation: 3= Extraneous Movement


Pupil Size: 0= Normal to Room Light


Bone or Joint Aches: 2= Severe Diffuse Aches


Runny Nose/ Eye Tearin= Runny Nose/Eyes


GI Upset > 30mins: 1= Stomach Cramp


Tremor Observation of Outstretched Hands: 2= Slight Tremor Visible


Yawning Observation: 1= 1-2x During Session


Anxiety or Irritability: 2=Irritable/Anxious


Goose Flesh Skin: 0=Smooth Skin


COWS Score: 15





BHS Progress Note (SOAP)


Subjective: 





Stomach ache, diarrhea, anxious, nauseous


Objective: 





18 11:44


 Last Vital Signs











Temp Pulse Resp BP Pulse Ox


 


 98.4 F   96 H  18   174/94 H   


 


 18 09:03  18 09:03  18 09:03  18 09:03   








B/P noted (has htn, on medications)





 Laboratory Tests











  18





  23:11 07:17 07:17


 


WBC   5.4 


 


RBC   5.50 


 


Hgb   15.2 


 


Hct   46.9 


 


MCV   85.3 


 


MCH   27.7 


 


MCHC   32.5 


 


RDW   15.6 


 


Plt Count   132 L 


 


MPV   7.8  D 


 


Sodium    142


 


Potassium    4.5


 


Chloride    105


 


Carbon Dioxide    27


 


Anion Gap    10


 


BUN    10


 


Creatinine    1.1


 


Creat Clearance w eGFR    > 60


 


Random Glucose    91


 


Calcium    9.1


 


Total Bilirubin    1.1 H


 


AST    45 H


 


ALT    37


 


Alkaline Phosphatase    84


 


Total Protein    6.9


 


Albumin    3.5


 


Urine Color  Yellow  


 


Urine Appearance  Clear  


 


Urine pH  5.0  


 


Ur Specific Gravity  1.019  


 


Urine Protein  Negative  


 


Urine Glucose (UA)  Negative  


 


Urine Ketones  Negative  


 


Urine Blood  Negative  


 


Urine Nitrite  Negative  


 


Urine Bilirubin  Negative  


 


Urine Urobilinogen  Negative  


 


Ur Leukocyte Esterase  Negative  








Labs reviewed


Assessment: 





18 11:47


Withdrawal sxs


Plan: 





Continue detox


Encouraged PO water intake

## 2018-11-15 ENCOUNTER — HOSPITAL ENCOUNTER (INPATIENT)
Dept: HOSPITAL 74 - JER | Age: 63
LOS: 4 days | Discharge: HOME | DRG: 720 | End: 2018-11-19
Attending: NURSE PRACTITIONER | Admitting: INTERNAL MEDICINE
Payer: COMMERCIAL

## 2018-11-15 VITALS — DIASTOLIC BLOOD PRESSURE: 100 MMHG | SYSTOLIC BLOOD PRESSURE: 159 MMHG | HEART RATE: 101 BPM

## 2018-11-15 VITALS — BODY MASS INDEX: 44.3 KG/M2

## 2018-11-15 VITALS — TEMPERATURE: 97.4 F

## 2018-11-15 DIAGNOSIS — N17.9: ICD-10-CM

## 2018-11-15 DIAGNOSIS — N28.89: ICD-10-CM

## 2018-11-15 DIAGNOSIS — R00.0: ICD-10-CM

## 2018-11-15 DIAGNOSIS — J18.9: ICD-10-CM

## 2018-11-15 DIAGNOSIS — D72.829: ICD-10-CM

## 2018-11-15 DIAGNOSIS — E66.01: ICD-10-CM

## 2018-11-15 DIAGNOSIS — F11.10: ICD-10-CM

## 2018-11-15 DIAGNOSIS — A41.9: Primary | ICD-10-CM

## 2018-11-15 DIAGNOSIS — R07.89: ICD-10-CM

## 2018-11-15 DIAGNOSIS — B18.2: ICD-10-CM

## 2018-11-15 DIAGNOSIS — F19.10: ICD-10-CM

## 2018-11-15 DIAGNOSIS — I10: ICD-10-CM

## 2018-11-15 DIAGNOSIS — J98.11: ICD-10-CM

## 2018-11-15 DIAGNOSIS — F17.210: ICD-10-CM

## 2018-11-15 LAB
ACANTHOCYTES BLD QL SMEAR: 0
ALBUMIN SERPL-MCNC: 3.6 G/DL (ref 3.4–5)
ALP SERPL-CCNC: 91 U/L (ref 45–117)
ALT SERPL-CCNC: 31 U/L (ref 13–61)
ANION GAP SERPL CALC-SCNC: 10 MMOL/L (ref 8–16)
ANISOCYTOSIS BLD QL: 0
APPEARANCE UR: CLEAR
AST SERPL-CCNC: 46 U/L (ref 15–37)
BASO STIPL BLD QL SMEAR: 0
BASOPHILS # BLD: 0.2 % (ref 0–2)
BASOPHILS # BLD: 0.6 % (ref 0–2)
BILIRUB SERPL-MCNC: 1.8 MG/DL (ref 0.2–1)
BILIRUB UR STRIP.AUTO-MCNC: NEGATIVE MG/DL
BUN SERPL-MCNC: 14 MG/DL (ref 7–18)
CALCIUM SERPL-MCNC: 9.2 MG/DL (ref 8.5–10.1)
CHLORIDE SERPL-SCNC: 94 MMOL/L (ref 98–107)
CO2 SERPL-SCNC: 29 MMOL/L (ref 21–32)
COLOR UR: (no result)
CREAT SERPL-MCNC: 1.2 MG/DL (ref 0.55–1.3)
DACRYOCYTES BLD QL SMEAR: 0
DEPRECATED RDW RBC AUTO: 15.1 % (ref 11.9–15.9)
DEPRECATED RDW RBC AUTO: 15.4 % (ref 11.9–15.9)
DOHLE BOD BLD QL SMEAR: 0
EOSINOPHIL # BLD: 0 % (ref 0–4.5)
EOSINOPHIL # BLD: 0.1 % (ref 0–4.5)
EPITH CASTS URNS QL MICRO: (no result) /HPF
GLUCOSE SERPL-MCNC: 95 MG/DL (ref 74–106)
HCT VFR BLD CALC: 50.9 % (ref 35.4–49)
HCT VFR BLD CALC: 51.2 % (ref 35.4–49)
HELMET CELLS BLD QL SMEAR: 0
HGB BLD-MCNC: 16.5 GM/DL (ref 11.7–16.9)
HGB BLD-MCNC: 17.1 GM/DL (ref 11.7–16.9)
HOWELL-JOLLY BOD BLD QL SMEAR: 0
INR BLD: 1.2 (ref 0.83–1.09)
KETONES UR QL STRIP: (no result)
LEUKOCYTE ESTERASE UR QL STRIP.AUTO: NEGATIVE
LIPASE SERPL-CCNC: 70 U/L (ref 73–393)
LYMPHOCYTES # BLD: 5 % (ref 8–40)
LYMPHOCYTES # BLD: 7.3 % (ref 8–40)
MACROCYTES BLD QL: 0
MAGNESIUM SERPL-MCNC: 1.5 MG/DL (ref 1.8–2.4)
MCH RBC QN AUTO: 27.2 PG (ref 25.7–33.7)
MCH RBC QN AUTO: 27.8 PG (ref 25.7–33.7)
MCHC RBC AUTO-ENTMCNC: 32.5 G/DL (ref 32–35.9)
MCHC RBC AUTO-ENTMCNC: 33.5 G/DL (ref 32–35.9)
MCV RBC: 83.2 FL (ref 80–96)
MCV RBC: 83.9 FL (ref 80–96)
MONOCYTES # BLD AUTO: 5.6 % (ref 3.8–10.2)
MONOCYTES # BLD AUTO: 6.7 % (ref 3.8–10.2)
MUCOUS THREADS URNS QL MICRO: (no result)
NEUTROPHILS # BLD: 85.4 % (ref 42.8–82.8)
NEUTROPHILS # BLD: 89.1 % (ref 42.8–82.8)
NITRITE UR QL STRIP: NEGATIVE
OVALOCYTES BLD QL SMEAR: 0
PH UR: 5 [PH] (ref 5–8)
PLATELET # BLD AUTO: 148 K/MM3 (ref 134–434)
PLATELET # BLD AUTO: 149 K/MM3 (ref 134–434)
PLATELET BLD QL SMEAR: (no result)
PLATELET BLD QL SMEAR: ADEQUATE
PMV BLD: 7.5 FL (ref 7.5–11.1)
PMV BLD: 7.6 FL (ref 7.5–11.1)
POTASSIUM SERPLBLD-SCNC: 4.3 MMOL/L (ref 3.5–5.1)
PROT SERPL-MCNC: 7.5 G/DL (ref 6.4–8.2)
PROT UR QL STRIP: (no result)
PROT UR QL STRIP: NEGATIVE
PT PNL PPP: 14.2 SEC (ref 9.7–13)
RBC # BLD AUTO: 6.07 M/MM3 (ref 4–5.6)
RBC # BLD AUTO: 6.16 M/MM3 (ref 4–5.6)
ROULEAUX BLD QL SMEAR: 0
SICKLE CELLS BLD QL SMEAR: 0
SODIUM SERPL-SCNC: 132 MMOL/L (ref 136–145)
SP GR UR: 1.02 (ref 1.01–1.03)
TARGETS BLD QL SMEAR: 0
TOXIC GRANULES BLD QL SMEAR: 0
UROBILINOGEN UR STRIP-MCNC: (no result) MG/DL (ref 0.2–1)
WBC # BLD AUTO: 20.1 K/MM3 (ref 4–10)
WBC # BLD AUTO: 23.8 K/MM3 (ref 4–10)

## 2018-11-15 PROCEDURE — G0378 HOSPITAL OBSERVATION PER HR: HCPCS

## 2018-11-15 PROCEDURE — A9502 TC99M TETROFOSMIN: HCPCS

## 2018-11-15 RX ADMIN — NITROGLYCERIN PRN MG: 0.4 TABLET SUBLINGUAL at 06:21

## 2018-11-15 RX ADMIN — NITROGLYCERIN PRN MG: 0.4 TABLET SUBLINGUAL at 06:31

## 2018-11-15 RX ADMIN — Medication SCH: at 23:47

## 2018-11-15 RX ADMIN — AMLODIPINE BESYLATE SCH: 10 TABLET ORAL at 10:17

## 2018-11-15 RX ADMIN — Medication SCH: at 10:17

## 2018-11-15 RX ADMIN — HYDROCHLOROTHIAZIDE SCH: 12.5 CAPSULE ORAL at 10:17

## 2018-11-15 RX ADMIN — NICOTINE SCH: 7 PATCH TRANSDERMAL at 10:17

## 2018-11-15 NOTE — PN
BHS Progress Note


Note: 





Patient's condition is worsening after Nitroglycrin  x 2 sublingual was not 

helpful. Patient is lethargic and diaphoretic. Patient is to be transferred to 

ER for further evaluation. Endorsed to Ms. Taylor Pederson NP.

## 2018-11-15 NOTE — PDOC
Attending Attestation





- Resident


Resident Name: Lev Jones





- HPI


HPI: 





11/15/18 08:33


pt presents to the Ed complaining of nausea with multiple episodes of non 

bloody non billious vomiting and epigastric abdominal pain.  History of heroin 

abuse, at [facility] for detox, reports that he has increased his heroin use 

since his last admission to [facility].  





Patient also had an episode of substenal chest pain that lasted approximately 

one minute.  Given nitroglycerin x 2 with resolution of his chest pain.  Denies 

radiation or shortness of breath.  Denies prior episodes of chest pain. 





- Physicial Exam


PE: 





11/15/18 08:40


Agree with resident exam.  Patient is alert and oriented x 3 and in no acute 

distress.  Abdomen is soft, non tender and non distended.  No peripheral edema. 





- Medical Decision Making





11/15/18 08:41


Pt presents to the ED complaining of chest pain, nausea and vomiting.  Abdomen 

is non tender.  Nausea and vomiting may be secondary to withdrawal, but will 

check labs to rule out infection or biliary disease.  Chest pain is concerning 

for ACS given HEART score of 4.  Currently chest pain free.  EKG shows new t 

wave inversions in inferior leads.  will give ASA and admit for observation for 

r/o ACS.

## 2018-11-15 NOTE — HP
CHIEF COMPLAINT: chest pain, abdominal pain





PCP:  none





HISTORY OF PRESENT ILLNESS:





Patient is a 63 year old male with a significant past medical history of 

hypertension, bilateral lower extremity edema, polysubstance abuse, IVDU x 20 

years ago, none recent, referred to [facility] for detox of opiate use.  Reports 

4-5 bags via inhalation.  Patient was at detox at [facility] but was sent to the 

ER after he c/o of crushing chest pain and became lethargic and diaphoretic.  

He was given Nitroglycerin 0.4mg x 2 sublingual but pain did not subside. An 

EKG at [facility] was reported to have t wave abnormality with possible 

inferolateral ischemia.   While he was in the Ed he c/o of chest pain, nausea, 

vomiting. Abdomen CT shows non specific mosaic opacity of the lungs with 

possible groundglass opacity in the lingula.  Platelike atelectasis changes.  A 

left renal mass was also found.  


Patient noted also to be congested in the ED.  





EKG shows new t wave inversion in inferior leads.  Patient being admitted to 

rule out ACS.  He is also noted to have an elevated WBC.  Will initiate a 

septic workup.





ER course was notable for:


(1) abd/ct/pelvis: kidneys with large periphellary calcified left posterior 

renal mass measuring 8.8 cm in ap dimension 9.2cm in width. tumor lesion 

suspected.


(2) renal u/s a 9.4 cm left posterior renal cortical lesion seen with prominent 

peipheral rim calcification.  


(3) leukocytosis, tachycardia, lethargy, 








Vital Signs











Temperature  98 F   11/14/18 22:18


 


Pulse Rate  88   11/14/18 22:18


 


Respiratory Rate  20   11/15/18 03:30


 


Blood Pressure  191/91 H  11/14/18 22:18


 


O2 Sat by Pulse Oximetry (%)      























Recent Travel:





PAST MEDICAL HISTORY:





PAST SURGICAL HISTORY:





Social History:


Smoking:Current every day smoker


Alcohol: none recent


Drugs: heroin





Family History:


Allergies





bee venom protein (honey bee) Allergy (Severe, Verified 11/15/18 07:57)


 Swelling


penicillin G Allergy (Severe, Verified 11/15/18 07:57)


 Swelling








HOME MEDICATIONS:


 Home Medications











 Medication  Instructions  Recorded


 


Amlodipine/Atorvastatin 10 mg PO DAILY 04/13/18





[Amlodipine-Atorvast 10-10 mg]  


 


Amlodipine Besylate 10 mg PO DAILY 30 Days #30 tablet 04/17/18


 


Hydrochlorothiazide 25 mg PO DAILY 30 Days #30 tablet 04/17/18











PHYSICAL EXAMINATION


 Vital Signs - 24 hr











  11/15/18 11/15/18





  07:35 07:39


 


Temperature 98.2 F 


 


Pulse Rate 94 H 92 H


 


Respiratory 16 18





Rate  


 


Blood Pressure 158/80 153/80


 


O2 Sat by Pulse 100 100





Oximetry (%)  








GENERAL: Awake, lethargic, in no acute distress, fatigue


HEAD: Normal with no signs of trauma, congestion noted


EYES: Pupils equal, round and reactive to light


EARS, NOSE, THROAT: clear drainage from nares, congested.


NECK: Normal range of motion, supple without lymphadenopathy, JVD, or masses.


LUNGS: diminished bilaterally


HEART: Regular rate and rhythm


ABDOMEN: Soft, distended


MUSCULOSKELETAL: No CVA tenderness.


UPPER EXTREMITIES: . No peripheral edema.


LOWER EXTREMITIES: swelling, erythema, inflammation, severe bilateral lower ext 

edema w/induration, dry skin , hyperkeratotic plaques. No open areas.  

bilateral lower ext edema.  will doppler. 


NEUROLOGICAL:  fatigue,calm, cooperative


PSYCHIATRIC: Cooperative.


Laboratory Results - last 24 hr











  11/15/18 11/15/18 11/15/18





  08:33 08:33 09:25


 


WBC    23.8 H


 


RBC    6.07 H


 


Hgb    16.5


 


Hct    50.9 H


 


MCV    83.9


 


MCH    27.2


 


MCHC    32.5


 


RDW    15.4


 


Plt Count    148


 


MPV    7.5


 


Absolute Neuts (auto)    21.2 H


 


Neutrophils %    89.1 H


 


Neutrophils % (Manual)    90.0 H


 


Band Neutrophils %    0.0


 


Lymphocytes %    5.0 L


 


Lymphocytes % (Manual)    3.0 L


 


Monocytes %    5.6


 


Monocytes % (Manual)    4


 


Eosinophils %    0.1


 


Eosinophils % (Manual)    0.0


 


Basophils %    0.2


 


Basophils % (Manual)    0.0


 


Myelocytes % (Man)    0


 


Promyelocytes % (Man)    0


 


Blast Cells % (Manual)    0


 


Nucleated RBC %    0


 


Metamyelocytes    0


 


Hypochromia    0


 


Toxic Granulation    0


 


Dohle Bodies    0


 


Platelet Estimate    Decreased


 


Polychromasia    0


 


Poikilocytosis    0


 


Basophilic Stippling    0


 


Anisocytosis    0


 


Microcytosis    0


 


Macrocytosis    0


 


Spherocytes    0


 


Sickle Cells    0


 


Target Cells    0


 


Tear Drop Cells    0


 


Ovalocytes    0


 


Stomatocytes    0


 


Helmet Cells    0


 


[place] Bodies    0


 


[place] Rings    0


 


[place] Cells    0


 


Acanthocytes (Spur)    0


 


Rouleaux    0


 


Fragmented RBCs    0


 


Schistocytes    0


 


PT with INR  14.20 H  


 


INR  1.20 H  


 


Sodium   132 L 


 


Potassium   4.3 


 


Chloride   94 L 


 


Carbon Dioxide   29 


 


Anion Gap   10 


 


BUN   14 


 


Creatinine   1.2 


 


Creat Clearance w eGFR   > 60 


 


Random Glucose   95 


 


Calcium   9.2 


 


Magnesium   1.5 L 


 


Total Bilirubin   1.8 H 


 


AST   46 H 


 


ALT   31 


 


Alkaline Phosphatase   91 


 


Troponin I   0.04 


 


Total Protein   7.5 


 


Albumin   3.6 


 


Lipase   70 L 


 


Urine Color   


 


Urine Appearance   


 


Urine pH   


 


Ur Specific Gravity   


 


Urine Protein   


 


Urine Glucose (UA)   


 


Urine Ketones   


 


Urine Blood   


 


Urine Nitrite   


 


Urine Bilirubin   


 


Urine Urobilinogen   


 


Ur Leukocyte Esterase   


 


Urine WBC (Auto)   


 


Urine RBC (Auto)   


 


Ur Epithelial Cells   


 


Urine Mucus   


 


Acetaminophen   < 2 L 














  11/15/18





  13:34


 


WBC 


 


RBC 


 


Hgb 


 


Hct 


 


MCV 


 


MCH 


 


MCHC 


 


RDW 


 


Plt Count 


 


MPV 


 


Absolute Neuts (auto) 


 


Neutrophils % 


 


Neutrophils % (Manual) 


 


Band Neutrophils % 


 


Lymphocytes % 


 


Lymphocytes % (Manual) 


 


Monocytes % 


 


Monocytes % (Manual) 


 


Eosinophils % 


 


Eosinophils % (Manual) 


 


Basophils % 


 


Basophils % (Manual) 


 


Myelocytes % (Man) 


 


Promyelocytes % (Man) 


 


Blast Cells % (Manual) 


 


Nucleated RBC % 


 


Metamyelocytes 


 


Hypochromia 


 


Toxic Granulation 


 


[place] Bodies 


 


Platelet Estimate 


 


Polychromasia 


 


Poikilocytosis 


 


Basophilic Stippling 


 


Anisocytosis 


 


Microcytosis 


 


Macrocytosis 


 


Spherocytes 


 


Sickle Cells 


 


Target Cells 


 


Tear Drop Cells 


 


Ovalocytes 


 


Stomatocytes 


 


Helmet Cells 


 


[place] Bodies 


 


Cabot Rings 


 


[place] Cells 


 


Acanthocytes (Spur) 


 


Rouleaux 


 


Fragmented RBCs 


 


Schistocytes 


 


PT with INR 


 


INR 


 


Sodium 


 


Potassium 


 


Chloride 


 


Carbon Dioxide 


 


Anion Gap 


 


BUN 


 


Creatinine 


 


Creat Clearance w eGFR 


 


Random Glucose 


 


Calcium 


 


Magnesium 


 


Total Bilirubin 


 


AST 


 


ALT 


 


Alkaline Phosphatase 


 


Troponin I 


 


Total Protein 


 


Albumin 


 


Lipase 


 


Urine Color  Sada


 


Urine Appearance  Clear


 


Urine pH  5.0


 


Ur Specific Gravity  1.020


 


Urine Protein  1+ H


 


Urine Glucose (UA)  Negative


 


Urine Ketones  1+ H


 


Urine Blood  1+ H


 


Urine Nitrite  Negative


 


Urine Bilirubin  Negative


 


Urine Urobilinogen  4.0 e.u/dl


 


Ur Leukocyte Esterase  Negative


 


Urine WBC (Auto)  4


 


Urine RBC (Auto)  1


 


Ur Epithelial Cells  Rare


 


Urine Mucus  Rare


 


Acetaminophen 











ASSESSMENT/PLAN:





Patient is a 63 year old male with a significant past medical history of 

hypertension, hepatitis c, bilateral lower extremity edema, polysubstance abuse

, IVDU x 20 years ago, none recent, referred to [facility] for detox of opiate 

use.  Reports 4-5 bags via inhalation.  Patient was at detox at [facility] but 

was sent to the ER after he c/o of crushing chest pain and became lethargic and 

diaphoretic.  He was given Nitroglycerin 0.4mg x 2 sublingual but pain did not 

subside. An EKG at [facility] was reported to have t wave abnormality with 

possible inferolateral ischemia.   While he was in the Ed he c/o of chest pain, 

nausea, vomiting. Abdomen CT shows non specific mosaic opacity of the lungs 

with possible groundglass opacity in the lingula.  Platelike atelectasis 

changes.  A left renal mass was also found.  


Patient noted also to be congested in the ED.  





EKG shows new t wave inversion in inferior leads.  Patient being admitted to 

rule out ACS.  He is also noted to have an elevated WBC.  Will initiate a 

septic workup.


-------


problem list


bilateral lower ext edema


polysubstance abuse


IVDU


Heroin use


Renal mass


sepsis


rule out acs


---------


Imaging:


abd/ct/pelvis: kidneys with large peripherally calcified left posterior renal 

mass measuring 8.8 cm in ap dimension 9.2cm in width. tumor lesion suspected.


renal u/s a 9.4 cm left posterior renal cortical lesion seen with prominent 

peipheral rim calcification.  





ID:


Meets SIRS criteria/early sepsis/possible pneumonia


WBC elevated. tachycardia. tachypnea. 


Presents with shortness of breath, chronic cough, fatigue. chest pain. 


Blood and urine cultures ordered and sent before antibiotic initiated


CT shows non specific mosaic opacity of the lungs with possible groundglass 

opacity 


PCN allergy


Will give one dose of Levaquin 750mg x 1 and consult ID for further 

recommendations.


Will order:


duonebs


supplemental oxygen


continuous pulse ox monitoring





Card:


Chest pain/shortness of breath


Rule out ACS


trend trops


echo


monitor on tele





Psyche


Polysubstance abuse, heroin abuse


On Methadone


Will consult detox specialist





Renal:


Large Calcified left posterior renal mass noted on imaging


ultrasound noted


Will consult renal for further recommendations





fen


gently hydrate


start low salt diet as tolerated


monitor electrolytes





prophy


heparin tid





l





Visit type





- Emergency Visit


Emergency Visit: Yes


ED Registration Date: 11/15/18


Care time: The patient presented to the Emergency Department on the above date 

and was hospitalized for further evaluation of their emergent condition.





- New Patient


This patient is new to me today: Yes


Date on this admission: 11/16/18





- Critical Care


Critical Care patient: No

## 2018-11-15 NOTE — EKG
Test Reason : 

Blood Pressure : ***/*** mmHG

Vent. Rate : 093 BPM     Atrial Rate : 093 BPM

   P-R Int : 136 ms          QRS Dur : 086 ms

    QT Int : 368 ms       P-R-T Axes : 043 015 235 degrees

   QTc Int : 457 ms

 

NORMAL SINUS RHYTHM

POSSIBLE LEFT ATRIAL ENLARGEMENT

LEFT VENTRICULAR HYPERTROPHY

T WAVE ABNORMALITY, CONSIDER INFEROLATERAL ISCHEMIA

ABNORMAL ECG

WHEN COMPARED WITH ECG OF 13-NOV-2018 17:59,

T WAVE INVERSION NOW EVIDENT IN INFERIOR LEADS

Confirmed by MIGUELITO RAMSAY MD ([id number]) on 11/15/2018 11:55:08 AM

 

Referred By:             Confirmed By:MIGUELITO RAMSAY MD

## 2018-11-15 NOTE — PDOC
History of Present Illness





- General


Chief Complaint: Pain


Stated Complaint: ABDOMINAL PAIN


Time Seen by Provider: 11/15/18 08:02


History Source: Patient, Old Records


Exam Limitations: No Limitations





- History of Present Illness


Initial Comments: 





62 y/o male presenting to [facility] ER via ambulance from [facility] detox facility. 

Electronic note from [facility] reports pt complained of chest pain at approx. 

6am. He received nitro x2 and zofran before being transferred. On arrival, pts 

chief complaint was resolved abdominal pain and nausea/vomiting x3 episodes. 

Endorsed possible bilious but nonbloody. Stated pain was across the top of his 

abdomen but resolved after he stopped throwing up. 





Social Hx: 


- Heroin abuse. Last used on Sunday (11 Nov 2018). Remote history of needle use 

(>20 years). 


- Tobacco: smokes 1 pack per day.


- EtOH: Denies





Medical Hx: 


- HTN


- Hep C, status unknown, no medications reported





Surgical Hx: 


- Pt denies past surgical history.








Past History





- Past Medical History


Allergies/Adverse Reactions: 


 Allergies











Allergy/AdvReac Type Severity Reaction Status Date / Time


 


bee venom protein (honey bee) Allergy Severe Swelling Verified 11/15/18 07:57


 


penicillin G Allergy Severe Swelling Verified 11/15/18 07:57











Home Medications: 


Ambulatory Orders





Amlodipine/Atorvastatin [Amlodipine-Atorvast 10-10 mg] 10 mg PO DAILY 04/13/18 


Amlodipine Besylate 10 mg PO DAILY 30 Days #30 tablet 04/17/18 


Amlodipine Besylate [Norvasc -] 10 mg PO DAILY #0 tablet 11/19/18 


Carvedilol [Coreg -] 6.25 mg PO BID #60 tablet 11/19/18 


Hydrochlorothiazide 25 mg PO DAILY 30 Days #30 tablet 11/19/18 


Valsartan [Diovan] 160 mg PO DAILY #30 tablet 11/19/18 


levoFLOXacin [Levaquin -] 250 mg PO DAILY@0600 #5 tablet 11/19/18 








Anemia: No


Asthma: No


Cancer: No


Cardiac Disorders: No


CVA: No


COPD: No


CHF: No


Diabetes: No


GI Disorders: No


 Disorders: No


HTN: Yes (On meds)


Hypercholesterolemia: No


Kidney Stones: No


Liver Disease: Yes (HEPATITIS C)


Seizures: No


Thyroid Disease: No





- Surgical History


Abdominal Surgery: No


Appendectomy: No


Cardiac Surgery: No


Cholecystectomy: No


Lung Surgery: No


Neurologic Surgery: No


Orthopedic Surgery: No





- Reproductive History


Testicular Surgery: No





- Suicide/Smoking/Psychosocial Hx


Smoking History: Current every day smoker


Have you smoked in the past 12 months: Yes


Number of Cigarettes Smoked Daily: 10


Information on smoking cessation initiated: No


'Breaking Loose' booklet given: 04/13/18


Hx Alcohol Use: No


Drug/Substance Use Hx: Yes


Substance Use Type: Heroin, Opiates


Hx Substance Use Treatment: Yes ([facility] on 04/18)





**Review of Systems





- Review of Systems


Able to Perform ROS?: Yes


Comments:: 





In addition to that documented in the HPI above, the additional ROS was obtained

:


Constitutional: Denies fevers or chills


Eyes: Denies vision changes


ENMT: Denies sore throat


CV: Denies chest pain


Resp: Denies SOB


GI: Endorses vomiting and diarrhea











*Physical Exam





- Vital Signs


 Last Vital Signs











Temp Pulse Resp BP Pulse Ox


 


 98.2 F   92 H  18   153/80   100 


 


 11/15/18 07:35  11/15/18 07:39  11/15/18 07:39  11/15/18 07:39  11/15/18 07:39














- Physical Exam


Comments: 





Constitutional: Obese male in no acute distress. Found semi-fowlers in hospital 

bed. Sleeping comfortable, arousable to voice. Alert and oriented x4. Answered 

all questions appropriately and completely. Speech was non-labored, non-

pressured.    





HEENT: Normocephalic. No obvious external signs of trauma. Hearing grossly 

normal. No nasal discharge. Neck is supple, trachea is midline.   


  


Cardiovascular: Regular rate and regular rhythm.  No murmur, rubs, clicks, or 

gallops. Peripheral pulses:  Radial pulses full.   


 


Respiratory: Breathing unlabored. Equal chest rise and fall. Clear to 

auscultation bilaterally. No stridor, no wheezing, no rhonchi. 


 


Gastrointestinal: abdomen is soft, non-tender, non-distended. No pulsatile 

masses. No overlying skin lesions or obvious signs of trauma. 


 


Neuro: Alert and oriented. Moving all four extremities spontaneously. 


  


Skin: Warm, dry, and intact.


 


Psych: Affect: appropriate.  Mood: normal.











ED Treatment Course





- LABORATORY


CBC & Chemistry Diagram: 


 11/19/18 05:30





 11/19/18 05:30





Medical Decision Making





- Medical Decision Making





*Reviewed vital signs, nursing notes, and prior visit documentation (if 

available).





Reviewed note from [facility] prior to transfer. Details a very different story 

than that pt told on initial interview. Note states pt was complaining of 

crushing substernal pain requiring two SL nitro with possible new EKG changes. 

Will obtain cardiac workup. ASA ordered.





HEART Score for Major Cardiac Events from [email or web address]  on 11/15/2018


RESULT SUMMARY:


5 points


Moderate Score (4-6 points)


Risk of MACE of 12-16.6%.


INPUTS:


History > 1 = Moderately suspicious


EKG > 1 = Non-specific repolarization disturbance


Age > 1 = 45-64


Risk factors > 2 = ?3 risk factors or history of atherosclerotic disease


Initial troponin > 0 = ?normal limit





EKG showed a sinus rhythm with a ventricular rate of 89 bpm. Normal axis. 

Normal intervals.  T wave inversions in I, II, III, aVF, V5, and V6. Prior EKG 

dated 14 April 2018 showed T wave inversions only in precordial leads. 





Troponin not elevated. Low suspicion for ACS, as this was drawn >3hrs since 

chest pain. However, heart score is moderate. Will likely admit for CPRO. 





Leukocytosis to 23.8 with left shift, which is a trend upward from 4 yesterday. 

Will obtain CT of abdomen given vague abdominal/GI complaint and leukocytosis. 





PT/INR elevated. Unsure of etiology. Pt is not on anticoagulants. 





CMP revealed elevated T. Bili and AST. Acetaminophen level not elevated. 





CT of Abdomen and Pelvis (without contrast): Large left peripherally calcified 

renal mass. Suspected tumoral lesion. Will obtain U/S to further evaluate. 





2018/11/15 13:21 Telephone consult with NP Helene Dominguez. Discussed HPI, ED 

course, and current plan of management. Agreed to admit to telemetry. 











*DC/Admit/Observation/Transfer


Diagnosis at time of Disposition: 


 Chest pain in adult, Left renal mass





Leukocytosis, unspecified


Qualifiers:


 Leukocytosis type: bandemia Qualified Code(s): D72.825 - Bandemia








- Discharge Dispostion


Disposition: HOME


Condition at time of disposition: Stable


Decision to Admit order: Yes





- Prescriptions





- Referrals





- Patient Instructions





- Post Discharge Activity

## 2018-11-15 NOTE — EKG
Test Reason : 

Blood Pressure : ***/*** mmHG

Vent. Rate : 085 BPM     Atrial Rate : 085 BPM

   P-R Int : 130 ms          QRS Dur : 082 ms

    QT Int : 378 ms       P-R-T Axes : 047 002 093 degrees

   QTc Int : 449 ms

 

NORMAL SINUS RHYTHM

MINIMAL VOLTAGE CRITERIA FOR LVH, MAY BE NORMAL VARIANT

T WAVE ABNORMALITY, CONSIDER LATERAL ISCHEMIA

ABNORMAL ECG

WHEN COMPARED WITH ECG OF 14-APR-2018 09:44,

NONSPECIFIC T WAVE ABNORMALITY, IMPROVED IN INFERIOR LEADS

INVERTED T WAVES HAVE REPLACED NONSPECIFIC T WAVE ABNORMALITY IN 

LATERAL LEADS

Confirmed by DEVENDRA AUSTIN, MIGUELITO ([id number]) on 11/15/2018 11:56:07 AM

 

Referred By:             Confirmed By:MIGUELITO RAMSAY MD

## 2018-11-15 NOTE — PN
[facility] Progress Note


Note: 





Patient complained of crushing chest pain rated at 8/10. He denies radiation at 

this time. Patient reports vomiting


 Vital Signs











Temperature  98 F   11/14/18 22:18


 


Pulse Rate  88   11/14/18 22:18


 


Respiratory Rate  20   11/15/18 03:30


 


Blood Pressure  191/91 H  11/14/18 22:18


 


O2 Sat by Pulse Oximetry (%)      








Action:Stat EKG ordered - T wave abnormality, consider inferolateral ischemia


          Nitroglycerin 0.4mg sublingual


          Zofran 4mg IM ordered

## 2018-11-16 LAB
ALBUMIN SERPL-MCNC: 3.2 G/DL (ref 3.4–5)
ALP SERPL-CCNC: 83 U/L (ref 45–117)
ALT SERPL-CCNC: 33 U/L (ref 13–61)
ANION GAP SERPL CALC-SCNC: 8 MMOL/L (ref 8–16)
AST SERPL-CCNC: 47 U/L (ref 15–37)
BASOPHILS # BLD: 0.3 % (ref 0–2)
BILIRUB SERPL-MCNC: 1.1 MG/DL (ref 0.2–1)
BUN SERPL-MCNC: 20 MG/DL (ref 7–18)
CALCIUM SERPL-MCNC: 8.6 MG/DL (ref 8.5–10.1)
CHLORIDE SERPL-SCNC: 97 MMOL/L (ref 98–107)
CO2 SERPL-SCNC: 29 MMOL/L (ref 21–32)
CREAT SERPL-MCNC: 1.2 MG/DL (ref 0.55–1.3)
DEPRECATED RDW RBC AUTO: 15.1 % (ref 11.9–15.9)
EOSINOPHIL # BLD: 0.3 % (ref 0–4.5)
GLUCOSE SERPL-MCNC: 80 MG/DL (ref 74–106)
HCT VFR BLD CALC: 58.3 % (ref 35.4–49)
HGB BLD-MCNC: 19.2 GM/DL (ref 11.7–16.9)
LYMPHOCYTES # BLD: 9.6 % (ref 8–40)
MAGNESIUM SERPL-MCNC: 2 MG/DL (ref 1.8–2.4)
MCH RBC QN AUTO: 27.6 PG (ref 25.7–33.7)
MCHC RBC AUTO-ENTMCNC: 33 G/DL (ref 32–35.9)
MCV RBC: 83.6 FL (ref 80–96)
MONOCYTES # BLD AUTO: 8.7 % (ref 3.8–10.2)
NEUTROPHILS # BLD: 81.1 % (ref 42.8–82.8)
PLATELET # BLD AUTO: 158 K/MM3 (ref 134–434)
PMV BLD: 7.5 FL (ref 7.5–11.1)
POTASSIUM SERPLBLD-SCNC: 3.9 MMOL/L (ref 3.5–5.1)
PROT SERPL-MCNC: 7.2 G/DL (ref 6.4–8.2)
RBC # BLD AUTO: 6.97 M/MM3 (ref 4–5.6)
SODIUM SERPL-SCNC: 134 MMOL/L (ref 136–145)
WBC # BLD AUTO: 14.6 K/MM3 (ref 4–10)

## 2018-11-16 RX ADMIN — IPRATROPIUM BROMIDE AND ALBUTEROL SULFATE SCH AMP: .5; 3 SOLUTION RESPIRATORY (INHALATION) at 21:25

## 2018-11-16 RX ADMIN — HEPARIN SODIUM SCH UNIT: 5000 INJECTION, SOLUTION INTRAVENOUS; SUBCUTANEOUS at 17:21

## 2018-11-16 RX ADMIN — HEPARIN SODIUM SCH UNIT: 5000 INJECTION, SOLUTION INTRAVENOUS; SUBCUTANEOUS at 22:54

## 2018-11-16 RX ADMIN — PANTOPRAZOLE SODIUM SCH MG: 40 TABLET, DELAYED RELEASE ORAL at 11:23

## 2018-11-16 RX ADMIN — IPRATROPIUM BROMIDE AND ALBUTEROL SULFATE SCH AMP: .5; 3 SOLUTION RESPIRATORY (INHALATION) at 12:13

## 2018-11-16 RX ADMIN — SODIUM CHLORIDE SCH MLS/HR: 9 INJECTION, SOLUTION INTRAVENOUS at 17:35

## 2018-11-16 RX ADMIN — VALSARTAN SCH MG: 160 TABLET, FILM COATED ORAL at 17:22

## 2018-11-16 RX ADMIN — IPRATROPIUM BROMIDE AND ALBUTEROL SULFATE SCH AMP: .5; 3 SOLUTION RESPIRATORY (INHALATION) at 11:24

## 2018-11-16 RX ADMIN — IPRATROPIUM BROMIDE AND ALBUTEROL SULFATE SCH AMP: .5; 3 SOLUTION RESPIRATORY (INHALATION) at 01:25

## 2018-11-16 RX ADMIN — IPRATROPIUM BROMIDE AND ALBUTEROL SULFATE SCH AMP: .5; 3 SOLUTION RESPIRATORY (INHALATION) at 16:29

## 2018-11-16 RX ADMIN — IPRATROPIUM BROMIDE AND ALBUTEROL SULFATE SCH AMP: .5; 3 SOLUTION RESPIRATORY (INHALATION) at 06:50

## 2018-11-16 RX ADMIN — AMLODIPINE BESYLATE SCH MG: 10 TABLET ORAL at 11:23

## 2018-11-16 RX ADMIN — HYDROCHLOROTHIAZIDE SCH MG: 25 TABLET ORAL at 11:23

## 2018-11-16 RX ADMIN — HEPARIN SODIUM SCH UNIT: 5000 INJECTION, SOLUTION INTRAVENOUS; SUBCUTANEOUS at 06:35

## 2018-11-16 RX ADMIN — AZTREONAM SCH: 1 INJECTION, POWDER, LYOPHILIZED, FOR SOLUTION INTRAMUSCULAR; INTRAVENOUS at 17:22

## 2018-11-16 RX ADMIN — AZTREONAM SCH MLS/HR: 1 INJECTION, POWDER, LYOPHILIZED, FOR SOLUTION INTRAMUSCULAR; INTRAVENOUS at 20:30

## 2018-11-16 NOTE — CON.CARD
Consult


Consult Specialty:: cardio





- History of Present Illness


Chief Complaint: cp


History of Present Illness: 





63 year old male sent for CP from [facility] in setting of detox admit for 

heroin abuse.  


Pt described crushing chest pain on DOA, and became lethargic and diaphoretic 

per staff at the center.


He was given Nitroglycerin 0.4mg x 2 which reportedly did not alleviate the 

pain. 


An EKG at [facility] allegedly showed t wave abnormality concerning for 

inferolateral ischemia.   


in ED here, + c/o chest pain, nausea, vomiting. 


Abdomen CT showed left renal mass. also noted in lungs was non-specific mosaic 

opacity and possible groundglass opacity in the lingula.  


Sat.s normal





pt denies back pain or "tearing" sensation at any time.


no cp today.


feels malaise which he states is c/w his heroin withdrawal sx





no palpitations.








PMH:


HTN


polysubstance abuse incl remote IVDU 


father + weak heart, ? chf 


denies etoh





- Alcohol/Substance Use


Hx Alcohol Use: No





- Smoking History


Smoking history: Current every day smoker


Have you smoked in the past 12 months: Yes


Aproximately how many cigarettes per day: 10





Home Medications





- Allergies


Allergies/Adverse Reactions: 


 Allergies











Allergy/AdvReac Type Severity Reaction Status Date / Time


 


bee venom protein (honey bee) Allergy Severe Swelling Verified 11/15/18 07:57


 


penicillin G Allergy Severe Swelling Verified 11/15/18 07:57














- Home Medications


Home Medications: 


Ambulatory Orders





Amlodipine/Atorvastatin [Amlodipine-Atorvast 10-10 mg] 10 mg PO DAILY 04/13/18 


Amlodipine Besylate 10 mg PO DAILY 30 Days #30 tablet 04/17/18 


Hydrochlorothiazide 25 mg PO DAILY 30 Days #30 tablet 04/17/18 











Family Disease History





- Family Disease History


Family Disease History: Diabetes: Grandparent, Mother, Other: Father





Review of Systems





- Review of Systems


Constitutional: reports: Malaise.  denies: Chills, Fever


Eyes: denies: Eye Pain


HENT: denies: Nasal Congestion


Neck: denies: Stiffness


Cardiovascular: denies: Palpitations


Respiratory: denies: Orthopnea, PND


Gastrointestinal: denies: Diarrhea, Rectal Bleeding


Genitourinary: denies: Burning, Hematuria


Musculoskeletal: denies: Muscle Pain


Integumentary: denies: Rash


Neurological: denies: Numbness, Seizure, Syncope


Endocrine: denies: Excessive Sweating


Hematology/Lymphatic: denies: Excessive Bleeding


Vital Signs: 


 Vital Signs











Temperature  98.2 F   11/16/18 10:52


 


Pulse Rate  102 H  11/16/18 10:52


 


Respiratory Rate  20   11/16/18 13:21


 


Blood Pressure  168/108 H  11/16/18 10:52


 


O2 Sat by Pulse Oximetry (%)  98   11/16/18 13:21











Constitutional: Yes: Well Nourished, No Distress


Eyes: No: Sclera Icterus


HENT: No: Nasal Congestion


Neck: No: Decreased ROM


Respiratory: Yes: CTA Bilaterally.  No: Accessory Muscle Use, Rales, Wheezes


Gastrointestinal: Yes: Normal Bowel Sounds.  No: Distention, Hepatomegaly, 

Palpable Mass, Tenderness


Cardiovascular: Yes: Regular Rate and Rhythm


JVD: No


Carotid Bruit: No


PMI: Non-Displaced


Heart Sounds: Yes: S1, S2.  No: Gallop


Murmur: No: Systolic Murmur, Diastolic Murmur


Musculoskeletal: Yes: Other (No kyphosis)


Extremities: No: Cold, Cyanosis


Edema: No


Peripheral Pulses: 2+ Left Carotid, 2+ Right Carotid, 2+ Left Doralis Pedis, 2+ 

Right Dorsalis Pedis


Integumentary: No: Jaundice


Neurological: Yes: Alert, Oriented (x3)


Psychiatric: No: Agitated





- Other Data


Labs, Other Data: 


 CBC, BMP





 11/16/18 09:10 





 11/16/18 11:50 





 INR, PTT











INR  1.20  (0.83-1.09)  H  11/15/18  08:33    








 Troponin, BNP











  11/15/18 11/16/18





  18:25 11:50


 


Troponin I  0.04  0.08 H








 Troponin, BNP











  11/15/18 11/16/18





  18:25 11:50


 


Troponin I  0.04  0.08 H








 Laboratory Tests











  11/15/18 11/15/18 11/15/18





  08:33 09:25 18:25


 


WBC   23.8 H 


 


Hgb   


 


Plt Count   


 


Sodium   


 


Potassium   


 


Carbon Dioxide   


 


BUN   


 


Creatinine   


 


AST   


 


ALT   


 


Troponin I  0.04   0.04


 


Albumin  3.6  


 


Lipase  70 L  














  11/16/18 11/16/18





  09:10 11:50


 


WBC  14.6 H 


 


Hgb  19.2 H 


 


Plt Count  158 


 


Sodium   134 L


 


Potassium   3.9


 


Carbon Dioxide   29


 


BUN   20 H


 


Creatinine   1.2


 


AST   47 H


 


ALT   33


 


Troponin I   0.08 H


 


Albumin   3.2 L


 


Lipase  














Assessment/Plan





Echo 11/16/18: TDS study, wall motion not well seen. IW and inferolat wall 

appear sev hypo in mult views, remainder appears moderately hypo. Overall EF mod

-sev reduced. RV tds. nl LA. valves WNL. normal ao root, no peric effusion.





CXR x 2: no widened mediastinum. clear lungs/pleura (excessive overlying soft 

tissue markings)





ECG 11/13: NSR, LVH with repol abn.s--more prominent than prior ecg


ECG 11/15 x 2: no signif change





tele: sinus tach








chest pain, LV systolic dysfunction:


-occurred in setting of detox from opiate abuse


-not ntg responsive, by report


-trop 0.04 x 2-->0.8 is NOT c/w NSTEMI


-ECG with no ischemic changes vs baseline LVH assctd repol abnormalities


-CXR x 2 without findings of CHF. LV hypokinesis is diffuse, ? new vs old. pt 

at risk for hypertensive CMP given his bp's here, and he admits to no routine 

phys exams with MD for many yrs


-bp control as doing


-check BNP


-check U tox for cocaine to confirm (he denies)--if negative, start carvedilol 

for BP and low EF


-denies sob, appears euvolemic--no lasix


-needs inpt pharm vasodilator stress test to r/o isch cmp once bp better 

controlled--? monday





HTN:


-bp moderately elevated today


-home meds (amlod 10, hctz 25) have been resumed here


-add valsartan today (also for low EF)


-add carvedilol once utox neg cocaine





leukocytosis:


-afeb


-nonspecific lung findings on CT scan


-infectious w/u ongoing, per hospitalist





renal mass:


-suspected tumor radiographically


-w/u per renal/

## 2018-11-16 NOTE — CON.ID
Consult


Consult Specialty:: infectious diseases


Reason for Consultation:: sepsis,leukocytosis





- History of Present Illness


Chief Complaint: chest pain


History of Present Illness: 





63 year old male with a significant past medical history of hypertension, 

bilateral lower extremity edema, polysubstance abuse, IVDU x 20 years ago, none 

recent, referred to [facility] for detox of opiate use.  Reports 4-5 bags via 

inhalation.  Patient was at detox at [facility] but was sent to the ER after he c

/o of crushing chest pain and became lethargic and diaphoretic.  He was given 

Nitroglycerin 0.4mg x 2 sublingual but pain did not subside. An EKG at [facility] 

was reported to have t wave abnormality with possible inferolateral ischemia.   

While he was in the Ed he c/o of chest pain, nausea, vomiting. Abdomen CT shows 

non specific mosaic opacity of the lungs with possible groundglass opacity in 

the lingula.  Platelike atelectasis changes.  A left renal mass was also found.

  








- History Source


History Provided By: Patient, Medical Record


Limitations to Obtaining History: Poor Historian





- Alcohol/Substance Use


Hx Alcohol Use: No





- Smoking History


Smoking history: Current every day smoker


Have you smoked in the past 12 months: Yes


Aproximately how many cigarettes per day: 10





Home Medications





- Allergies


Allergies/Adverse Reactions: 


 Allergies











Allergy/AdvReac Type Severity Reaction Status Date / Time


 


bee venom protein (honey bee) Allergy Severe Swelling Verified 11/15/18 07:57


 


penicillin G Allergy Severe Swelling Verified 11/15/18 07:57














- Home Medications


Home Medications: 


Ambulatory Orders





RX: Amlodipine/Atorvastatin [Amlodipine-Atorvast 10-10 mg] 10 mg PO DAILY 04/13/ 18 


RX: Amlodipine Besylate 10 mg PO DAILY 30 Days #30 tablet 04/17/18 


RX: Amlodipine Besylate [Norvasc -] 10 mg PO DAILY #0 tablet 11/19/18 


RX: Carvedilol [Coreg -] 6.25 mg PO BID #60 tablet 11/19/18 


RX: Hydrochlorothiazide 25 mg PO DAILY 30 Days #30 tablet 11/19/18 


RX: Valsartan [Diovan] 160 mg PO DAILY #30 tablet 11/19/18 


RX: levoFLOXacin [Levaquin -] 250 mg PO DAILY@0600 #5 tablet 11/19/18 











Family Disease History





- Family Disease History


Family Disease History: Diabetes: Grandparent, Mother, Other: Father





Review of Systems





- Review of Systems


Constitutional: reports: No Symptoms


Eyes: reports: No Symptoms


HENT: reports: No Symptoms


Neck: reports: No Symptoms


Cardiovascular: reports: Chest Pain


Respiratory: reports: No Symptoms


Gastrointestinal: reports: No Symptoms


Genitourinary: reports: No Symptoms


Musculoskeletal: reports: No Symptoms


Integumentary: reports: No Symptoms


Neurological: reports: Dizziness


Endocrine: reports: No Symptoms, Unexplained Weight Loss


Psychiatric: reports: No Symptoms





Physical Exam


Vital Signs: 


 Vital Signs











Temperature  98.2 F   11/16/18 10:52


 


Pulse Rate  102 H  11/16/18 10:52


 


Respiratory Rate  20   11/16/18 10:52


 


Blood Pressure  168/108 H  11/16/18 10:52


 


O2 Sat by Pulse Oximetry (%)  98   11/16/18 10:52











Constitutional: Yes: Well Nourished, Calm, Mild Distress


Eyes: Yes: Conjunctiva Clear


HENT: Yes: Atraumatic, Normocephalic


Neck: Yes: Supple, Trachea Midline


Cardiovascular: Yes: Regular Rate and Rhythm


Respiratory: Yes: Regular, CTA Bilaterally


Gastrointestinal: Yes: Normal Bowel Sounds, Soft


Musculoskeletal: Yes: WNL


Extremities: Yes: WNL


Neurological: Yes: Alert, Oriented


Labs: 


 CBC, BMP





 11/16/18 09:10 





 11/16/18 11:50 











Imaging





- Results


Chest X-ray: Report Reviewed, Image Reviewed


Cat Scan: Report Reviewed, Image Reviewed





Assessment/Plan





63 year old male with a significant past medical history of hypertension, 

hepatitis c, bilateral lower extremity edema, polysubstance abuse, IVDU x 20 

years ago, 


problem list





bilateral lower ext edema


polysubstance abuse


IVDU


Heroin use


Renal mass


sepsis


leukocytosis





patient looks comfortable,





plan


will start patient on zosyn


monitor wbc closely


gi involvement


and cardio


i think we should get a ct scan of the chest also


rest as per the team

## 2018-11-16 NOTE — CON.NEP
Consult


Consult Specialty:: nephrology


Referred by:: dr johnson


Reason for Consultation:: left renal mass





- History of Present Illness


Chief Complaint: admitted for detox


History of Present Illness: 





admitted for detox


found to have a large left renal mass on sono and abd ct scan


he has been told of this in the past (april 2018)


but he did not follow up





labs show rbc and wbc in urine








- History Source


History Provided By: Patient, Medical Record


Limitations to Obtaining History: No Limitations





- Alcohol/Substance Use


Hx Alcohol Use: No





- Smoking History


Smoking history: Current every day smoker


Have you smoked in the past 12 months: Yes


Aproximately how many cigarettes per day: 10





Home Medications





- Allergies


Allergies/Adverse Reactions: 


 Allergies











Allergy/AdvReac Type Severity Reaction Status Date / Time


 


bee venom protein (honey bee) Allergy Severe Swelling Verified 11/15/18 07:57


 


penicillin G Allergy Severe Swelling Verified 11/15/18 07:57














- Home Medications


Home Medications: 


Ambulatory Orders





Amlodipine/Atorvastatin [Amlodipine-Atorvast 10-10 mg] 10 mg PO DAILY 04/13/18 


Amlodipine Besylate 10 mg PO DAILY 30 Days #30 tablet 04/17/18 


Hydrochlorothiazide 25 mg PO DAILY 30 Days #30 tablet 04/17/18 











Family Disease History





- Family Disease History


Family Disease History: Diabetes: Grandparent, Mother, Other: Father





Nephrology Consult





- Height


Height: 5 ft 9 in





- Weight


Weight: 300 lb





- BMI


Body Mass Index (BMI): 44.3





- Lab Results


CBC,BMP: 


 CBC, BMP





 11/16/18 09:10 





 11/16/18 11:50 








Anion Gap: 


 Anion Gap











Anion Gap  8 MMOL/L (8-16)   11/16/18  11:50    














- Physical Examination


Vital Signs: 


 Vital Signs











Temperature  98.2 F   11/16/18 10:52


 


Pulse Rate  102 H  11/16/18 10:52


 


Respiratory Rate  20   11/16/18 13:21


 


Blood Pressure  168/108 H  11/16/18 10:52


 


O2 Sat by Pulse Oximetry (%)  98   11/16/18 13:21














Assessment/Plan


admitted for detox


found to have a right renal mass on sono and abd ct scan


he has been told of this in the past (april 2018)


but he did not follow up


The mass is peripherally calcified and has likelihood of malignancy








labs show rbc and wbc in urine


hemoconcentration


prerenal azotemia





urine has 1 + protein and trace blood





needs to be evaluated by Urology

## 2018-11-16 NOTE — PN
Physical Exam: 


SUBJECTIVE: Patient seen and examined





OBJECTIVE:


 Vital Signs











 Period  Temp  Pulse  Resp  BP Sys/Menon  Pulse Ox


 


 Last 24 Hr  98.2 F-98.8 F    16-28  159-170/  97-98





GENERAL: Awake, lethargic, in no acute distress, fatigue


HEAD: Normal with no signs of trauma, congestion noted


EYES: Pupils equal, round and reactive to light


EARS, NOSE, THROAT: clear drainage from nares, congested.


NECK: Normal range of motion, supple without lymphadenopathy, JVD, or masses.


LUNGS: diminished bilaterally


HEART: Regular rate and rhythm


ABDOMEN: Soft, distended


MUSCULOSKELETAL: No CVA tenderness.


UPPER EXTREMITIES: . No peripheral edema.


LOWER EXTREMITIES: swelling, erythema, inflammation, severe bilateral lower ext 

edema w/induration, dry skin , hyperkeratotic plaques. No open areas.  

bilateral lower ext edema. negative for dvt


NEUROLOGICAL:  fatigue,calm, cooperative


PSYCHIATRIC: Cooperative.


 Laboratory Results - last 24 hr











  11/15/18 11/15/18 11/15/18





  09:25 13:34 18:12


 


WBC    20.1 H


 


RBC    6.16 H


 


Hgb    17.1 H


 


Hct    51.2 H


 


MCV    83.2


 


MCH    27.8


 


MCHC    33.5


 


RDW    15.1


 


Plt Count    149


 


MPV    7.6


 


Absolute Neuts (auto)    17.2 H


 


Neutrophils %    85.4 H


 


Neutrophils % (Manual)  90.0 H   82.0


 


Band Neutrophils %  0.0   0.0


 


Lymphocytes %    7.3 L D


 


Lymphocytes % (Manual)  3.0 L   11.0  D


 


Monocytes %    6.7


 


Monocytes % (Manual)  4   5


 


Eosinophils %    0.0  D


 


Eosinophils % (Manual)  0.0   0.0


 


Basophils %    0.6


 


Basophils % (Manual)  0.0   0.0


 


Myelocytes % (Man)  0  


 


Promyelocytes % (Man)  0  


 


Blast Cells % (Manual)  0  


 


Nucleated RBC %    0


 


Metamyelocytes  0  


 


Hypochromia  0  


 


Toxic Granulation  0  


 


[place]oh[place] Bodies  0  


 


Platelet Estimate  Decreased   Adequate


 


Polychromasia  0  


 


Poikilocytosis  0  


 


Basophilic Stippling  0  


 


Anisocytosis  0  


 


Microcytosis  0  


 


Macrocytosis  0  


 


Spherocytes  0  


 


Sickle Cells  0  


 


Target Cells  0  


 


Tear Drop Cells  0  


 


Ovalocytes  0  


 


Stomatocytes  0  


 


Helmet Cells  0  


 


[place] Bodies  0  


 


[place] Rings  0  


 


[place] Cells  0  


 


Acanthocytes (Spur)  0  


 


Rouleaux  0  


 


Fragmented RBCs  0  


 


Schistocytes  0  


 


PTT (Actin FS)   


 


Sodium   


 


Potassium   


 


Chloride   


 


Carbon Dioxide   


 


Anion Gap   


 


BUN   


 


Creatinine   


 


Creat Clearance w eGFR   


 


Random Glucose   


 


Hemoglobin A1c %   


 


Lactic Acid   


 


Calcium   


 


Magnesium   


 


Total Bilirubin   


 


AST   


 


ALT   


 


Alkaline Phosphatase   


 


Troponin I   


 


Total Protein   


 


Albumin   


 


Urine Color   Sada 


 


Urine Appearance   Clear 


 


Urine pH   5.0 


 


Ur Specific Gravity   1.020 


 


Urine Protein   1+ H 


 


Urine Glucose (UA)   Negative 


 


Urine Ketones   1+ H 


 


Urine Blood   1+ H 


 


Urine Nitrite   Negative 


 


Urine Bilirubin   Negative 


 


Urine Urobilinogen   4.0 e.u/dl 


 


Ur Leukocyte Esterase   Negative 


 


Urine WBC (Auto)   4 


 


Urine RBC (Auto)   1 


 


Ur Epithelial Cells   Rare 


 


Urine Mucus   Rare 


 


Influenza A (Rapid)   


 


Influenza B (Rapid)   














  11/15/18 11/15/18 11/15/18





  18:25 21:50 22:30


 


WBC   


 


RBC   


 


Hgb   


 


Hct   


 


MCV   


 


MCH   


 


MCHC   


 


RDW   


 


Plt Count   


 


MPV   


 


Absolute Neuts (auto)   


 


Neutrophils %   


 


Neutrophils % (Manual)   


 


Band Neutrophils %   


 


Lymphocytes %   


 


Lymphocytes % (Manual)   


 


Monocytes %   


 


Monocytes % (Manual)   


 


Eosinophils %   


 


Eosinophils % (Manual)   


 


Basophils %   


 


Basophils % (Manual)   


 


Myelocytes % (Man)   


 


Promyelocytes % (Man)   


 


Blast Cells % (Manual)   


 


Nucleated RBC %   


 


Metamyelocytes   


 


Hypochromia   


 


Toxic Granulation   


 


[place] Bodies   


 


Platelet Estimate   


 


Polychromasia   


 


Poikilocytosis   


 


Basophilic Stippling   


 


Anisocytosis   


 


Microcytosis   


 


Macrocytosis   


 


Spherocytes   


 


Sickle Cells   


 


Target Cells   


 


Tear Drop Cells   


 


Ovalocytes   


 


Stomatocytes   


 


Helmet Cells   


 


[place] Bodies   


 


[place] Rings   


 


[place] Cells   


 


Acanthocytes (Spur)   


 


Rouleaux   


 


Fragmented RBCs   


 


Schistocytes   


 


PTT (Actin FS)   


 


Sodium   


 


Potassium   


 


Chloride   


 


Carbon Dioxide   


 


Anion Gap   


 


BUN   


 


Creatinine   


 


Creat Clearance w eGFR   


 


Random Glucose   


 


Hemoglobin A1c %   


 


Lactic Acid    1.5


 


Calcium   


 


Magnesium   


 


Total Bilirubin   


 


AST   


 


ALT   


 


Alkaline Phosphatase   


 


Troponin I  0.04  


 


Total Protein   


 


Albumin   


 


Urine Color   


 


Urine Appearance   


 


Urine pH   


 


Ur Specific Gravity   


 


Urine Protein   


 


Urine Glucose (UA)   


 


Urine Ketones   


 


Urine Blood   


 


Urine Nitrite   


 


Urine Bilirubin   


 


Urine Urobilinogen   


 


Ur Leukocyte Esterase   


 


Urine WBC (Auto)   


 


Urine RBC (Auto)   


 


Ur Epithelial Cells   


 


Urine Mucus   


 


Influenza A (Rapid)   Negative 


 


Influenza B (Rapid)   Negative 














  11/16/18 11/16/18 11/16/18





  09:10 09:10 09:10


 


WBC  14.6 H  


 


RBC  6.97 H  


 


Hgb  19.2 H  


 


Hct  58.3 H  


 


MCV  83.6  


 


MCH  27.6  


 


MCHC  33.0  


 


RDW  15.1  


 


Plt Count  158  


 


MPV  7.5  


 


Absolute Neuts (auto)  11.9 H  


 


Neutrophils %  81.1  


 


Neutrophils % (Manual)   


 


Band Neutrophils %   


 


Lymphocytes %  9.6  D  


 


Lymphocytes % (Manual)   


 


Monocytes %  8.7  


 


Monocytes % (Manual)   


 


Eosinophils %  0.3  D  


 


Eosinophils % (Manual)   


 


Basophils %  0.3  


 


Basophils % (Manual)   


 


Myelocytes % (Man)   


 


Promyelocytes % (Man)   


 


Blast Cells % (Manual)   


 


Nucleated RBC %  0  


 


Metamyelocytes   


 


Hypochromia   


 


Toxic Granulation   


 


[place] Bodies   


 


Platelet Estimate   


 


Polychromasia   


 


Poikilocytosis   


 


Basophilic Stippling   


 


Anisocytosis   


 


Microcytosis   


 


Macrocytosis   


 


Spherocytes   


 


Sickle Cells   


 


Target Cells   


 


Tear Drop Cells   


 


Ovalocytes   


 


Stomatocytes   


 


Helmet Cells   


 


[place] Bodies   


 


C[place] Rings   


 


[place] Cells   


 


Acanthocytes (Spur)   


 


Rouleaux   


 


Fragmented RBCs   


 


Schistocytes   


 


PTT (Actin FS)   37.2 H 


 


Sodium    Cancelled


 


Potassium    Cancelled


 


Chloride    Cancelled


 


Carbon Dioxide    Cancelled


 


Anion Gap    Cancelled


 


BUN    Cancelled


 


Creatinine    Cancelled


 


Creat Clearance w eGFR    Cancelled


 


Random Glucose    Cancelled


 


Hemoglobin A1c %   


 


Lactic Acid   


 


Calcium    Cancelled


 


Magnesium    Cancelled


 


Total Bilirubin    Cancelled


 


AST    Cancelled


 


ALT    Cancelled


 


Alkaline Phosphatase    Cancelled


 


Troponin I   


 


Total Protein    Cancelled


 


Albumin    Cancelled


 


Urine Color   


 


Urine Appearance   


 


Urine pH   


 


Ur Specific Gravity   


 


Urine Protein   


 


Urine Glucose (UA)   


 


Urine Ketones   


 


Urine Blood   


 


Urine Nitrite   


 


Urine Bilirubin   


 


Urine Urobilinogen   


 


Ur Leukocyte Esterase   


 


Urine WBC (Auto)   


 


Urine RBC (Auto)   


 


Ur Epithelial Cells   


 


Urine Mucus   


 


Influenza A (Rapid)   


 


Influenza B (Rapid)   














  11/16/18 11/16/18





  09:10 11:50


 


WBC  


 


RBC  


 


Hgb  


 


Hct  


 


MCV  


 


MCH  


 


MCHC  


 


RDW  


 


Plt Count  


 


MPV  


 


Absolute Neuts (auto)  


 


Neutrophils %  


 


Neutrophils % (Manual)  


 


Band Neutrophils %  


 


Lymphocytes %  


 


Lymphocytes % (Manual)  


 


Monocytes %  


 


Monocytes % (Manual)  


 


Eosinophils %  


 


Eosinophils % (Manual)  


 


Basophils %  


 


Basophils % (Manual)  


 


Myelocytes % (Man)  


 


Promyelocytes % (Man)  


 


Blast Cells % (Manual)  


 


Nucleated RBC %  


 


Metamyelocytes  


 


Hypochromia  


 


Toxic Granulation  


 


[place] Bodies  


 


Platelet Estimate  


 


Polychromasia  


 


Poikilocytosis  


 


Basophilic Stippling  


 


Anisocytosis  


 


Microcytosis  


 


Macrocytosis  


 


Spherocytes  


 


Sickle Cells  


 


Target Cells  


 


Tear Drop Cells  


 


Ovalocytes  


 


Stomatocytes  


 


Helmet Cells  


 


[place] Bodies  


 


[place] Rings  


 


[place] Cells  


 


Acanthocytes (Spur)  


 


Rouleaux  


 


Fragmented RBCs  


 


Schistocytes  


 


PTT (Actin FS)  


 


Sodium   134 L


 


Potassium   3.9


 


Chloride   97 L


 


Carbon Dioxide   29


 


Anion Gap   8


 


BUN   20 H


 


Creatinine   1.2


 


Creat Clearance w eGFR   > 60


 


Random Glucose   80


 


Hemoglobin A1c %  5.7 


 


Lactic Acid  


 


Calcium   8.6


 


Magnesium   2.0


 


Total Bilirubin   1.1 H


 


AST   47 H


 


ALT   33


 


Alkaline Phosphatase   83


 


Troponin I   0.08 H


 


Total Protein   7.2


 


Albumin   3.2 L


 


Urine Color  


 


Urine Appearance  


 


Urine pH  


 


Ur Specific Gravity  


 


Urine Protein  


 


Urine Glucose (UA)  


 


Urine Ketones  


 


Urine Blood  


 


Urine Nitrite  


 


Urine Bilirubin  


 


Urine Urobilinogen  


 


Ur Leukocyte Esterase  


 


Urine WBC (Auto)  


 


Urine RBC (Auto)  


 


Ur Epithelial Cells  


 


Urine Mucus  


 


Influenza A (Rapid)  


 


Influenza B (Rapid)  








Active Medications











Generic Name Dose Route Start Last Admin





  Trade Name Freq  PRN Reason Stop Dose Admin


 


Albuterol/Ipratropium  1 amp  11/16/18 00:00  11/16/18 12:13





  Duoneb -  NEB   1 amp





  RQ4H ELVI   Administration





     





     





     





     


 


Amlodipine Besylate  10 mg  11/16/18 10:00  11/16/18 11:23





  Norvasc -  PO   10 mg





  DAILY ELVI   Administration





     





     





     





     


 


Heparin Sodium (Porcine)  5,000 unit  11/16/18 06:00  11/16/18 06:35





  Heparin -  SQ   5,000 unit





  TID ELVI   Administration





     





     





     





     


 


Hydrochlorothiazide  25 mg  11/16/18 10:00  11/16/18 11:23





  Hctz -  PO   25 mg





  DAILY ELVI   Administration





     





     





     





     


 


Sodium Chloride  1,000 mls @ 75 mls/hr  11/15/18 23:30  11/16/18 01:10





  Normal Saline -  IV   75 mls/hr





  ASDIR ELVI   Administration





     





     





     





     


 


Aztreonam 1 gm/ Dextrose  50 mls @ 100 mls/hr  11/16/18 13:00  





  IVPB   





  Q8H-IV ELVI   





     





     





  Protocol   





     


 


Pantoprazole Sodium  40 mg  11/16/18 10:00  11/16/18 11:23





  Protonix -  PO   40 mg





  DAILY ELVI   Administration





     





     





     





     











ASSESSMENT/PLAN:





Patient is a 63 year old male with a significant past medical history of 

hypertension, hepatitis c, bilateral lower extremity edema, polysubstance abuse

, IVDU x 20 years ago, none recent, referred to [facility] for detox of opiate 

use.  Reports 4-5 bags via inhalation.  Patient was at detox at [facility] but 

was sent to the ER after he c/o of crushing chest pain and became lethargic and 

diaphoretic.  He was given Nitroglycerin 0.4mg x 2 sublingual but pain did not 

subside. An EKG at [facility] was reported to have t wave abnormality with 

possible inferolateral ischemia.   While he was in the Ed he c/o of chest pain, 

nausea, vomiting. Abdomen CT shows non specific mosaic opacity of the lungs 

with possible groundglass opacity in the lingula.  Platelike atelectasis 

changes.  A left renal mass was also found.  


EKG shows new t wave inversion in inferior leads.  Patient being admitted to 

rule out ACS.  He is also noted to have an elevated WBC.  septic workup 

initiated.


-------


problem list


bilateral lower ext edema


polysubstance abuse


IVDU


Heroin use


Renal mass


sepsis


rule out acs


---------


Imaging:


abd/ct/pelvis: kidneys with large peripherally calcified left posterior renal 

mass measuring 8.8 cm in ap dimension 9.2cm in width. tumor lesion suspected.


renal u/s a 9.4 cm left posterior renal cortical lesion seen with prominent 

peripheral rim calcification.  





ID:


Meets SIRS criteria/early sepsis/possible pneumonia


WBC elevated. tachycardia. tachypnea. 


Presents with shortness of breath, chronic cough, fatigue. chest pain. 


Blood and urine cultures ordered and sent before antibiotic initiated-pending


CT shows non specific mosaic opacity of the lungs with possible groundglass 

opacity 


PCN allergy


Started on Azactam per ID


On duonebs, supplemental oxygen. monitor pulse ox.





Card:


Chest pain/shortness of breath


Rule out ACS


trops negative


echo reviewed


monitor on tele


will need stress test





Hypertension.  BP remains elevated.  BB to be initiated once urine negative for 

cocaine.





Psyche


Polysubstance abuse, heroin abuse


On Methadone taper, given last taper dose today





Renal:


Large Calcified left posterior renal mass noted on imaging


ultrasound noted


Urology consulted





fen


gently hydrate


start low salt diet as tolerated


monitor electrolytes





prophy


heparin tid








Visit type





- Emergency Visit


Emergency Visit: Yes


ED Registration Date: 11/15/18


Care time: The patient presented to the Emergency Department on the above date 

and was hospitalized for further evaluation of their emergent condition.





- New Patient


This patient is new to me today: No





- Critical Care


Critical Care patient: No





- Discharge Referral


Physician Referral: David Olivarez MD ([facility])

## 2018-11-17 LAB
ALBUMIN SERPL-MCNC: 3.5 G/DL (ref 3.4–5)
ALP SERPL-CCNC: 80 U/L (ref 45–117)
ALT SERPL-CCNC: 39 U/L (ref 13–61)
AMPHET UR-MCNC: NEGATIVE NG/ML
ANION GAP SERPL CALC-SCNC: 13 MMOL/L (ref 8–16)
AST SERPL-CCNC: 78 U/L (ref 15–37)
BARBITURATES UR-MCNC: NEGATIVE NG/ML
BENZODIAZ UR SCN-MCNC: NEGATIVE NG/ML
BILIRUB SERPL-MCNC: 1.3 MG/DL (ref 0.2–1)
BUN SERPL-MCNC: 19 MG/DL (ref 7–18)
CALCIUM SERPL-MCNC: 9 MG/DL (ref 8.5–10.1)
CHLORIDE SERPL-SCNC: 95 MMOL/L (ref 98–107)
CO2 SERPL-SCNC: 26 MMOL/L (ref 21–32)
COCAINE UR-MCNC: NEGATIVE NG/ML
CREAT SERPL-MCNC: 1.3 MG/DL (ref 0.55–1.3)
DEPRECATED RDW RBC AUTO: 15 % (ref 11.9–15.9)
GLUCOSE SERPL-MCNC: 79 MG/DL (ref 74–106)
HCT VFR BLD CALC: 53.9 % (ref 35.4–49)
HGB BLD-MCNC: 17.7 GM/DL (ref 11.7–16.9)
MCH RBC QN AUTO: 27.5 PG (ref 25.7–33.7)
MCHC RBC AUTO-ENTMCNC: 32.8 G/DL (ref 32–35.9)
MCV RBC: 83.9 FL (ref 80–96)
METHADONE UR-MCNC: POSITIVE NG/ML
OPIATES UR QL SCN: POSITIVE NG/ML
PCP UR QL SCN: NEGATIVE NG/ML
PLATELET # BLD AUTO: 153 K/MM3 (ref 134–434)
PMV BLD: 7.7 FL (ref 7.5–11.1)
POTASSIUM SERPLBLD-SCNC: 3.8 MMOL/L (ref 3.5–5.1)
PROT SERPL-MCNC: 7.5 G/DL (ref 6.4–8.2)
RBC # BLD AUTO: 6.43 M/MM3 (ref 4–5.6)
SODIUM SERPL-SCNC: 134 MMOL/L (ref 136–145)
WBC # BLD AUTO: 8.9 K/MM3 (ref 4–10)

## 2018-11-17 RX ADMIN — SODIUM CHLORIDE SCH MLS/HR: 9 INJECTION, SOLUTION INTRAVENOUS at 15:02

## 2018-11-17 RX ADMIN — IPRATROPIUM BROMIDE AND ALBUTEROL SULFATE SCH: .5; 3 SOLUTION RESPIRATORY (INHALATION) at 01:05

## 2018-11-17 RX ADMIN — AZTREONAM SCH MLS/HR: 1 INJECTION, POWDER, LYOPHILIZED, FOR SOLUTION INTRAMUSCULAR; INTRAVENOUS at 03:00

## 2018-11-17 RX ADMIN — IPRATROPIUM BROMIDE AND ALBUTEROL SULFATE SCH AMP: .5; 3 SOLUTION RESPIRATORY (INHALATION) at 08:45

## 2018-11-17 RX ADMIN — HEPARIN SODIUM SCH UNIT: 5000 INJECTION, SOLUTION INTRAVENOUS; SUBCUTANEOUS at 13:16

## 2018-11-17 RX ADMIN — IPRATROPIUM BROMIDE AND ALBUTEROL SULFATE SCH: .5; 3 SOLUTION RESPIRATORY (INHALATION) at 04:30

## 2018-11-17 RX ADMIN — HEPARIN SODIUM SCH UNIT: 5000 INJECTION, SOLUTION INTRAVENOUS; SUBCUTANEOUS at 22:54

## 2018-11-17 RX ADMIN — AMLODIPINE BESYLATE SCH MG: 10 TABLET ORAL at 10:05

## 2018-11-17 RX ADMIN — CARVEDILOL SCH MG: 3.12 TABLET, FILM COATED ORAL at 22:54

## 2018-11-17 RX ADMIN — AZTREONAM SCH MLS/HR: 1 INJECTION, POWDER, LYOPHILIZED, FOR SOLUTION INTRAMUSCULAR; INTRAVENOUS at 15:02

## 2018-11-17 RX ADMIN — IPRATROPIUM BROMIDE AND ALBUTEROL SULFATE SCH: .5; 3 SOLUTION RESPIRATORY (INHALATION) at 20:27

## 2018-11-17 RX ADMIN — AZTREONAM SCH: 1 INJECTION, POWDER, LYOPHILIZED, FOR SOLUTION INTRAMUSCULAR; INTRAVENOUS at 18:00

## 2018-11-17 RX ADMIN — IPRATROPIUM BROMIDE AND ALBUTEROL SULFATE SCH: .5; 3 SOLUTION RESPIRATORY (INHALATION) at 11:52

## 2018-11-17 RX ADMIN — IPRATROPIUM BROMIDE AND ALBUTEROL SULFATE SCH AMP: .5; 3 SOLUTION RESPIRATORY (INHALATION) at 15:45

## 2018-11-17 RX ADMIN — HYDROCHLOROTHIAZIDE SCH MG: 25 TABLET ORAL at 10:05

## 2018-11-17 RX ADMIN — HEPARIN SODIUM SCH UNIT: 5000 INJECTION, SOLUTION INTRAVENOUS; SUBCUTANEOUS at 06:37

## 2018-11-17 RX ADMIN — VALSARTAN SCH MG: 160 TABLET, FILM COATED ORAL at 10:05

## 2018-11-17 RX ADMIN — PANTOPRAZOLE SODIUM SCH MG: 40 TABLET, DELAYED RELEASE ORAL at 10:05

## 2018-11-17 NOTE — PN
Progress Note, Physician


History of Present Illness: 





No complaints, feel simproved


No chest pains


Tele: No arrythmias noted





- Current Medication List


Current Medications: 


Active Medications





Albuterol/Ipratropium (Duoneb -)  1 amp NEB RQ4H [facility]


   Last Admin: 11/17/18 15:45 Dose:  1 amp


Amlodipine Besylate (Norvasc -)  10 mg PO DAILY [facility]


   Last Admin: 11/17/18 10:05 Dose:  10 mg


Heparin Sodium (Porcine) (Heparin -)  5,000 unit SQ TID [facility]


   Last Admin: 11/17/18 13:16 Dose:  5,000 unit


Hydrochlorothiazide (Hctz -)  25 mg PO DAILY [facility]


   Last Admin: 11/17/18 10:05 Dose:  25 mg


Aztreonam 1 gm/ Dextrose  50 mls @ 100 mls/hr IVPB Q8H-IV [facility]VI; Protocol


   Last Admin: 11/17/18 15:02 Dose:  100 mls/hr


Sodium Chloride (Normal Saline -)  1,000 mls @ 100 mls/hr IV ASDIR [facility]


   Last Admin: 11/17/18 15:02 Dose:  100 mls/hr


Melatonin (Melatonin)  5 mg PO [facility]


Pantoprazole Sodium (Protonix -)  40 mg PO DAILY [facility]


   Last Admin: 11/17/18 10:05 Dose:  40 mg


Valsartan (Diovan -)  160 mg PO DAILY [facility]


   Last Admin: 11/17/18 10:05 Dose:  160 mg











- Objective


Vital Signs: 


 Vital Signs











Temperature  97.6 F   11/17/18 14:20


 


Pulse Rate  97 H  11/17/18 14:20


 


Respiratory Rate  24 H  11/17/18 14:20


 


Blood Pressure  146/99   11/17/18 14:20


 


O2 Sat by Pulse Oximetry (%)  96   11/17/18 09:00











Constitutional: Yes: No Distress


Eyes: Yes: WNL


HENT: Yes: WNL


Neck: Yes: WNL


Cardiovascular: Yes: Regular Rate and Rhythm


Respiratory: Yes: CTA Bilaterally


Gastrointestinal: Yes: Normal Bowel Sounds


Musculoskeletal: Yes: WNL


Extremities: Yes: WNL


Edema: Yes


Labs: 


 CBC, BMP





 11/17/18 05:30 





 11/17/18 05:30 





 INR, PTT











INR  1.20  (0.83-1.09)  H  11/15/18  08:33    














Assessment/Plan








chest pain, LV systolic dysfunction:


-per reports occurred in setting of detox from opiate abuse


-trop 0.04 x 2-->0.8 is NOT c/w NSTEMI


-ECG with no ischemic changes vs baseline LVH assctd repol abnormalities


-CXR x 2 without findings of CHF. LV hypokinesis is diffuse, ? new vs old. pt 

at risk for hypertensive CMP given his bp's here


-check U tox (+) opiates and methadone, negative for cocaine.  Will start low 

dose Coreg


-denies sob, appears euvolemic--no lasix


-needs inpt pharm vasodilator stress test to r/o isch cmp once bp better 

controlled--? monday





HTN:


-bp moderately elevated today


-home meds (amlod 10, hctz 25) have been resumed here


-On valsartan (also for low EF)


-Start carvedilol 3.125mg PO BID 





leukocytosis:


-afeb


-nonspecific lung findings on CT scan


-infectious w/u ongoing, per hospitalist





renal mass:


-suspected tumor radiographically


-w/u per renal/

## 2018-11-17 NOTE — PN
Progress Note, Physician


History of Present Illness: 





patient wants to go home


explained to him importance of stayin


wbc has normalized


otherwise comfortable





- Current Medication List


Current Medications: 


Active Medications





Albuterol/Ipratropium (Duoneb -)  1 amp NEB RQ4H [facility]


   Last Admin: 11/17/18 11:52 Dose:  Not Given


Amlodipine Besylate (Norvasc -)  10 mg PO DAILY [facility]


   Last Admin: 11/17/18 10:05 Dose:  10 mg


Heparin Sodium (Porcine) (Heparin -)  5,000 unit SQ TID [facility]


   Last Admin: 11/17/18 13:16 Dose:  5,000 unit


Hydrochlorothiazide (Hctz -)  25 mg PO DAILY [facility]


   Last Admin: 11/17/18 10:05 Dose:  25 mg


Aztreonam 1 gm/ Dextrose  50 mls @ 100 mls/hr IVPB Q8H-IV [facility]; Protocol


   Last Admin: 11/17/18 03:00 Dose:  100 mls/hr


Sodium Chloride (Normal Saline -)  1,000 mls @ 100 mls/hr IV ASDIR [facility]


   Last Admin: 11/16/18 17:35 Dose:  100 mls/hr


Melatonin (Melatonin)  5 mg PO HS PRN


   PRN Reason: INSOMNIA


Pantoprazole Sodium (Protonix -)  40 mg PO DAILY [facility]


   Last Admin: 11/17/18 10:05 Dose:  40 mg


Valsartan (Diovan -)  160 mg PO DAILY [facility]


   Last Admin: 11/17/18 10:05 Dose:  160 mg











- Objective


Vital Signs: 


 Vital Signs











Temperature  97.4 F L  11/17/18 10:00


 


Pulse Rate  93 H  11/17/18 10:00


 


Respiratory Rate  22 H  11/17/18 10:00


 


Blood Pressure  169/96   11/17/18 10:00


 


O2 Sat by Pulse Oximetry (%)  96   11/17/18 09:00











Constitutional: Yes: No Distress, Calm, Obese


Cardiovascular: Yes: Regular Rate and Rhythm


Respiratory: Yes: Regular, CTA Bilaterally


Gastrointestinal: Yes: Normal Bowel Sounds, Soft


Musculoskeletal: Yes: WNL


Extremities: Yes: WNL


Neurological: Yes: Alert, Oriented


Psychiatric: Yes: Alert, Oriented


Labs: 


 CBC, BMP





 11/17/18 05:30 





 11/17/18 05:30 





 INR, PTT











INR  1.20  (0.83-1.09)  H  11/15/18  08:33    














Assessment/Plan





63 year old male with a significant past medical history of hypertension, 

hepatitis c, bilateral lower extremity edema, polysubstance abuse, IVDU x 20 

years ago, 


problem list





bilateral lower ext edema


polysubstance abuse


IVDU


Heroin use


Renal mass


sepsis


leukocytosis





patient looks comfortable,





plan


continue abx


will await for ct chest 


once done will make a plan


rest as per the team

## 2018-11-17 NOTE — CON.GU
Consult





- History of Present Illness


History of Present Illness: 





62 yo male admitted with CP, found to have incidental 9 cm left renal mass on 

Noncontrast CT.No flank pain/hematuria





- Alcohol/Substance Use


Hx Alcohol Use: No





- Smoking History


Smoking history: Current every day smoker


Have you smoked in the past 12 months: Yes


Aproximately how many cigarettes per day: 10





Home Medications





- Allergies


Allergies/Adverse Reactions: 


 Allergies











Allergy/AdvReac Type Severity Reaction Status Date / Time


 


bee venom protein (honey bee) Allergy Severe Swelling Verified 11/15/18 07:57


 


penicillin G Allergy Severe Swelling Verified 11/15/18 07:57














- Home Medications


Home Medications: 


Ambulatory Orders





Amlodipine/Atorvastatin [Amlodipine-Atorvast 10-10 mg] 10 mg PO DAILY 04/13/18 


Amlodipine Besylate 10 mg PO DAILY 30 Days #30 tablet 04/17/18 


Hydrochlorothiazide 25 mg PO DAILY 30 Days #30 tablet 04/17/18 











Family Disease History





- Family Disease History


Family Disease History: Diabetes: Grandparent, Mother, Other: Father





Review of Systems





- Review of Systems


Genitourinary: reports: No Symptoms





Physical Exam-


Vital Signs: 


 Vital Signs











Temperature  97.4 F L  11/17/18 10:00


 


Pulse Rate  93 H  11/17/18 10:00


 


Respiratory Rate  22 H  11/17/18 10:00


 


Blood Pressure  169/96   11/17/18 10:00


 


O2 Sat by Pulse Oximetry (%)  96   11/17/18 09:00











Renal/: Yes: Other (no hematuria, no CVAT)


Labs: 


 CBC, BMP





 11/17/18 05:30 





 11/17/18 05:30 











Imaging





- Results


Cat Scan: Report Reviewed





Problem List





- Problems


(1) Left renal mass


Assessment/Plan: 


will need repeat CT with IV contrast


Code(s): N28.89 - OTHER SPECIFIED DISORDERS OF KIDNEY AND URETER

## 2018-11-17 NOTE — PN
Physical Exam: 


SUBJECTIVE: 





Patient seen and examined at the bedside. 


wants to go home.  discussed importance of staying for antibiotics, stress test 

and further imaging to evaluate the renal mass. 


willing to stay, but wants to leave by tomorrow.  informed him that he can sign 

out AMA.  he is thinking about it.





OBJECTIVE:


 Vital Signs











 Period  Temp  Pulse  Resp  BP Sys/Menon  Pulse Ox


 


 Last 24 Hr  97.4 F-98.0 F  93-98  18-24  146-172/  96-98











GENERAL: Awake, alert, oriented x 3


HEAD: Normal with no signs of trauma, congestion noted


EYES: Pupils equal, round and reactive to light


EARS, NOSE, THROAT: clear drainage from nares, congested.


NECK: Normal range of motion, supple without lymphadenopathy, JVD, or masses.


LUNGS: diminished bilaterally


HEART: Regular rate and rhythm


ABDOMEN: Soft, distended


MUSCULOSKELETAL: No CVA tenderness.


UPPER EXTREMITIES: . No peripheral edema.


LOWER EXTREMITIES: swelling, erythema, inflammation, severe bilateral lower ext 

edema w/induration, dry skin , hyperkeratotic plaques. No open areas.  

bilateral lower ext edema. negative for dvt


NEUROLOGICAL:  fatigue,calm, cooperative


PSYCHIATRIC: Cooperative.


 Laboratory Results - last 24 hr











  11/17/18 11/17/18 11/17/18





  05:30 05:30 06:50


 


WBC  8.9  


 


RBC  6.43 H  


 


Hgb  17.7 H  


 


Hct  53.9 H  


 


MCV  83.9  


 


MCH  27.5  


 


MCHC  32.8  


 


RDW  15.0  


 


Plt Count  153  


 


MPV  7.7  


 


Sodium   134 L 


 


Potassium   3.8 


 


Chloride   95 L 


 


Carbon Dioxide   26 


 


Anion Gap   13 


 


BUN   19 H 


 


Creatinine   1.3 


 


Creat Clearance w eGFR   55.58 


 


Random Glucose   79 


 


Calcium   9.0 


 


Total Bilirubin   1.3 H 


 


AST   78 H 


 


ALT   39 


 


Alkaline Phosphatase   80 


 


Total Protein   7.5 


 


Albumin   3.5 


 


Opiates Screen    Positive A*


 


Methadone Screen    Positive A*


 


Barbiturate Screen    Negative


 


Phencyclidine Screen    Negative


 


Ur Amphetamines Screen    Negative


 


MDMA (Ecstasy) Screen    Negative


 


Benzodiazepines Screen    Negative


 


Cocaine Screen    Negative


 


U Marijuana (THC) Screen    Negative








Active Medications











Generic Name Dose Route Start Last Admin





  Trade Name Freq  PRN Reason Stop Dose Admin


 


Albuterol/Ipratropium  1 amp  11/16/18 00:00  11/17/18 11:52





  Duoneb -  NEB   Not Given





  RQ4H ELVI   





     





     





     





     


 


Amlodipine Besylate  10 mg  11/16/18 10:00  11/17/18 10:05





  Norvasc -  PO   10 mg





  DAILY ELVI   Administration





     





     





     





     


 


Heparin Sodium (Porcine)  5,000 unit  11/16/18 06:00  11/17/18 13:16





  Heparin -  SQ   5,000 unit





  TID ELVI   Administration





     





     





     





     


 


Hydrochlorothiazide  25 mg  11/16/18 10:00  11/17/18 10:05





  Hctz -  PO   25 mg





  DAILY ELVI   Administration





     





     





     





     


 


Aztreonam 1 gm/ Dextrose  50 mls @ 100 mls/hr  11/16/18 13:00  11/17/18 15:02





  IVPB   100 mls/hr





  Q8H-IV ELVI   Administration





     





     





  Protocol   





     


 


Sodium Chloride  1,000 mls @ 100 mls/hr  11/16/18 14:11  11/17/18 15:02





  Normal Saline -  IV   100 mls/hr





  ASDIR ELVI   Administration





     





     





     





     


 


Melatonin  5 mg  11/17/18 07:30  





  Melatonin  PO   





  HS PRN   





  INSOMNIA   





     





     





     


 


Pantoprazole Sodium  40 mg  11/16/18 10:00  11/17/18 10:05





  Protonix -  PO   40 mg





  DAILY ELVI   Administration





     





     





     





     


 


Valsartan  160 mg  11/16/18 15:45  11/17/18 10:05





  Diovan -  PO   160 mg





  DAILY ELVI   Administration





     





     





     





     











ASSESSMENT/PLAN:


Patient is a 63 year old male with a significant past medical history of 

hypertension, hepatitis c, bilateral lower extremity edema, polysubstance abuse

, IVDU x 20 years ago, none recent, referred to [facility] for detox of opiate 

use.  Reports 4-5 bags via inhalation.  Patient was at detox at [facility] but 

was sent to the ER after he c/o of crushing chest pain and became lethargic and 

diaphoretic.  He was given Nitroglycerin 0.4mg x 2 sublingual but pain did not 

subside. An EKG at [facility] was reported to have t wave abnormality with 

possible inferolateral ischemia.   While he was in the Ed he c/o of chest pain, 

nausea, vomiting. Abdomen CT shows non specific mosaic opacity of the lungs 

with possible groundglass opacity in the lingula.  Platelike atelectasis 

changes.  A left renal mass was also found.  


EKG shows new t wave inversion in inferior leads.  Patient being admitted to 

rule out ACS.  He is also noted to have an elevated WBC.  septic workup 

initiated.


-------


problem list


bilateral lower ext edema


polysubstance abuse


IVDU


Heroin use


Renal mass


sepsis


rule out acs


---------


Imaging:


abd/ct/pelvis: kidneys with large peripherally calcified left posterior renal 

mass measuring 8.8 cm in ap dimension 9.2cm in width. tumor lesion suspected.


renal u/s a 9.4 cm left posterior renal cortical lesion seen with prominent 

peripheral rim calcification.  


CT shows non specific mosaic opacity of the lungs with possible groundglass 

opacity in the lingula.  Platelike atelectasis changes.  A left renal mass was 

also found.  





ID: 


Meets SIRS criteria/early sepsis/possible pneumonia


WBC elevated. tachycardia. tachypnea. 


Presents with shortness of breath, chronic cough, fatigue. chest pain. 


Blood and urine cultures ordered and sent before antibiotic initiated-pending


CT shows non specific mosaic opacity of the lungs with possible groundglass 

opacity.  ct chest ordered.


Started on Azactam per ID


On duonebs, supplemental oxygen. monitor pulse ox.





Card: 


Chest pain/shortness of breath, resolved.  For stress test Monday.


Hypertension.  BP remains elevated.  urine negative for cocain, +for opiates (

pt on metadone).





Psyche: 


Polysubstance abuse, heroin abuse


On Methadone taper, given last taper dose today





Renal/: 


Large Calcified left posterior renal mass noted on imaging


ultrasound noted.  for repeat MRI today.





Heme:  


Hemoconcentration.  improving with IVF.  daily cbc. 





fen


gently hydrate


start low salt diet as tolerated


monitor electrolytes





prophy


heparin tid





Visit type





- Emergency Visit


Emergency Visit: Yes


ED Registration Date: 11/15/18


Care time: The patient presented to the Emergency Department on the above date 

and was hospitalized for further evaluation of their emergent condition.





- New Patient


This patient is new to me today: No





- Critical Care


Critical Care patient: No





- Discharge Referral


Referred to [facility] No

## 2018-11-18 LAB
ALBUMIN SERPL-MCNC: 3.4 G/DL (ref 3.4–5)
ALP SERPL-CCNC: 73 U/L (ref 45–117)
ALT SERPL-CCNC: 40 U/L (ref 13–61)
ANION GAP SERPL CALC-SCNC: 12 MMOL/L (ref 8–16)
AST SERPL-CCNC: 67 U/L (ref 15–37)
BASOPHILS # BLD: 0.4 % (ref 0–2)
BILIRUB SERPL-MCNC: 1.4 MG/DL (ref 0.2–1)
BUN SERPL-MCNC: 22 MG/DL (ref 7–18)
CALCIUM SERPL-MCNC: 9.1 MG/DL (ref 8.5–10.1)
CHLORIDE SERPL-SCNC: 98 MMOL/L (ref 98–107)
CO2 SERPL-SCNC: 26 MMOL/L (ref 21–32)
CREAT SERPL-MCNC: 1.4 MG/DL (ref 0.55–1.3)
DEPRECATED RDW RBC AUTO: 14.9 % (ref 11.9–15.9)
EOSINOPHIL # BLD: 1.3 % (ref 0–4.5)
GLUCOSE SERPL-MCNC: 80 MG/DL (ref 74–106)
HCT VFR BLD CALC: 54 % (ref 35.4–49)
HGB BLD-MCNC: 17.3 GM/DL (ref 11.7–16.9)
LYMPHOCYTES # BLD: 24.9 % (ref 8–40)
MCH RBC QN AUTO: 27.3 PG (ref 25.7–33.7)
MCHC RBC AUTO-ENTMCNC: 32.1 G/DL (ref 32–35.9)
MCV RBC: 85 FL (ref 80–96)
MONOCYTES # BLD AUTO: 12.5 % (ref 3.8–10.2)
NEUTROPHILS # BLD: 60.9 % (ref 42.8–82.8)
PLATELET # BLD AUTO: 173 K/MM3 (ref 134–434)
PMV BLD: 7.4 FL (ref 7.5–11.1)
POTASSIUM SERPLBLD-SCNC: 3.9 MMOL/L (ref 3.5–5.1)
PROT SERPL-MCNC: 7.3 G/DL (ref 6.4–8.2)
RBC # BLD AUTO: 6.34 M/MM3 (ref 4–5.6)
SODIUM SERPL-SCNC: 136 MMOL/L (ref 136–145)
WBC # BLD AUTO: 8.3 K/MM3 (ref 4–10)

## 2018-11-18 RX ADMIN — LIDOCAINE SCH PATCH: 50 PATCH TOPICAL at 17:11

## 2018-11-18 RX ADMIN — HYDROCHLOROTHIAZIDE SCH MG: 25 TABLET ORAL at 10:30

## 2018-11-18 RX ADMIN — CARVEDILOL SCH: 3.12 TABLET, FILM COATED ORAL at 10:31

## 2018-11-18 RX ADMIN — IPRATROPIUM BROMIDE AND ALBUTEROL SULFATE SCH AMP: .5; 3 SOLUTION RESPIRATORY (INHALATION) at 20:28

## 2018-11-18 RX ADMIN — AZTREONAM SCH MLS/HR: 1 INJECTION, POWDER, LYOPHILIZED, FOR SOLUTION INTRAMUSCULAR; INTRAVENOUS at 12:00

## 2018-11-18 RX ADMIN — CARVEDILOL SCH MG: 6.25 TABLET, FILM COATED ORAL at 10:30

## 2018-11-18 RX ADMIN — IPRATROPIUM BROMIDE AND ALBUTEROL SULFATE SCH AMP: .5; 3 SOLUTION RESPIRATORY (INHALATION) at 07:45

## 2018-11-18 RX ADMIN — CARVEDILOL SCH MG: 6.25 TABLET, FILM COATED ORAL at 21:49

## 2018-11-18 RX ADMIN — KETOROLAC TROMETHAMINE SCH MG: 10 TABLET, FILM COATED ORAL at 17:12

## 2018-11-18 RX ADMIN — AZTREONAM SCH MLS/HR: 1 INJECTION, POWDER, LYOPHILIZED, FOR SOLUTION INTRAMUSCULAR; INTRAVENOUS at 03:06

## 2018-11-18 RX ADMIN — IPRATROPIUM BROMIDE AND ALBUTEROL SULFATE SCH: .5; 3 SOLUTION RESPIRATORY (INHALATION) at 04:26

## 2018-11-18 RX ADMIN — AMLODIPINE BESYLATE SCH MG: 10 TABLET ORAL at 10:29

## 2018-11-18 RX ADMIN — HEPARIN SODIUM SCH: 5000 INJECTION, SOLUTION INTRAVENOUS; SUBCUTANEOUS at 17:11

## 2018-11-18 RX ADMIN — IPRATROPIUM BROMIDE AND ALBUTEROL SULFATE SCH: .5; 3 SOLUTION RESPIRATORY (INHALATION) at 12:00

## 2018-11-18 RX ADMIN — IPRATROPIUM BROMIDE AND ALBUTEROL SULFATE SCH: .5; 3 SOLUTION RESPIRATORY (INHALATION) at 00:27

## 2018-11-18 RX ADMIN — VALSARTAN SCH MG: 160 TABLET, FILM COATED ORAL at 10:29

## 2018-11-18 RX ADMIN — PANTOPRAZOLE SODIUM SCH MG: 40 TABLET, DELAYED RELEASE ORAL at 10:29

## 2018-11-18 RX ADMIN — HEPARIN SODIUM SCH UNIT: 5000 INJECTION, SOLUTION INTRAVENOUS; SUBCUTANEOUS at 06:33

## 2018-11-18 RX ADMIN — AZTREONAM SCH MLS/HR: 1 INJECTION, POWDER, LYOPHILIZED, FOR SOLUTION INTRAMUSCULAR; INTRAVENOUS at 17:11

## 2018-11-18 RX ADMIN — IPRATROPIUM BROMIDE AND ALBUTEROL SULFATE SCH: .5; 3 SOLUTION RESPIRATORY (INHALATION) at 15:46

## 2018-11-18 RX ADMIN — HEPARIN SODIUM SCH UNIT: 5000 INJECTION, SOLUTION INTRAVENOUS; SUBCUTANEOUS at 21:49

## 2018-11-18 NOTE — PN
Physical Exam: 


SUBJECTIVE: Patient seen and examined at the bedside.   





OBJECTIVE:


 Vital Signs











 Period  Temp  Pulse  Resp  BP Sys/Menon  Pulse Ox


 


 Last 24 Hr  97.4 F-98 F  78-86  20-20  /70-92  98-98








GENERAL: Awake, alert, oriented x 3


HEAD: Normal with no signs of trauma, congestion noted


EYES: Pupils equal, round and reactive to light


EARS, NOSE, THROAT: clear drainage from nares, congested.


NECK: Normal range of motion, supple without lymphadenopathy, JVD, or masses.


LUNGS: diminished bilaterally


HEART: Regular rate and rhythm


ABDOMEN: Soft, distended


MUSCULOSKELETAL: No CVA tenderness.


UPPER EXTREMITIES: . No peripheral edema.


LOWER EXTREMITIES: swelling, erythema, inflammation, severe bilateral lower ext 

edema w/induration, dry skin , hyperkeratotic plaques. No open areas.  

bilateral lower ext edema. negative for dvt


NEUROLOGICAL:  fatigue,calm, cooperative


PSYCHIATRIC: Cooperative


 Laboratory Results - last 24 hr











  11/18/18 11/18/18





  09:05 09:05


 


WBC  8.3 


 


RBC  6.34 H 


 


Hgb  17.3 H 


 


Hct  54.0 H 


 


MCV  85.0 


 


MCH  27.3 


 


MCHC  32.1 


 


RDW  14.9 


 


Plt Count  173 


 


MPV  7.4 L 


 


Absolute Neuts (auto)  5.0 


 


Neutrophils %  60.9  D 


 


Lymphocytes %  24.9  D 


 


Monocytes %  12.5 H 


 


Eosinophils %  1.3  D 


 


Basophils %  0.4 


 


Nucleated RBC %  0 


 


Sodium   136


 


Potassium   3.9


 


Chloride   98


 


Carbon Dioxide   26


 


Anion Gap   12


 


BUN   22 H


 


Creatinine   1.4 H


 


Creat Clearance w eGFR   51.02


 


Random Glucose   80


 


Calcium   9.1


 


Total Bilirubin   1.4 H


 


AST   67 H


 


ALT   40


 


Alkaline Phosphatase   73


 


Total Protein   7.3


 


Albumin   3.4








Active Medications











Generic Name Dose Route Start Last Admin





  Trade Name Freq  PRN Reason Stop Dose Admin


 


Acetaminophen  650 mg  11/18/18 16:03  





  Tylenol -  PO   





  Q6H PRN   





  PAIN LEVEL 7 - 10   





     





     





     


 


Albuterol/Ipratropium  1 amp  11/16/18 00:00  11/18/18 20:28





  Duoneb -  NEB   1 amp





  RQ4H ELVI   Administration





     





     





     





     


 


Amlodipine Besylate  10 mg  11/16/18 10:00  11/18/18 10:29





  Norvasc -  PO   10 mg





  DAILY ELVI   Administration





     





     





     





     


 


Carvedilol  6.25 mg  11/18/18 10:15  11/18/18 10:30





  Coreg -  PO   6.25 mg





  BID ELVI   Administration





     





     





     





     


 


Heparin Sodium (Porcine)  5,000 unit  11/16/18 06:00  11/18/18 17:11





  Heparin -  SQ   Not Given





  TID ELVI   





     





     





     





     


 


Hydrochlorothiazide  25 mg  11/16/18 10:00  11/18/18 10:30





  Hctz -  PO   25 mg





  DAILY ELVI   Administration





     





     





     





     


 


Aztreonam 1 gm/ Dextrose  50 mls @ 100 mls/hr  11/16/18 13:00  11/18/18 17:11





  IVPB   100 mls/hr





  Q8H-IV ELVI   Administration





     





     





  Protocol   





     


 


Sodium Chloride  1,000 mls @ 100 mls/hr  11/16/18 14:11  11/17/18 15:02





  Normal Saline -  IV   100 mls/hr





  ASDIR ELVI   Administration





     





     





     





     


 


Ketorolac Tromethamine  10 mg  11/18/18 16:15  11/18/18 17:12





  Toradol  PO  11/23/18 16:14  10 mg





  Q6HPO ELVI   Administration





     





     





     





     


 


Lidocaine  1 patch  11/18/18 16:15  11/18/18 17:11





  Lidoderm Patch -  TP   1 patch





  DAILY ELVI   Administration





     





     





     





     


 


Melatonin  5 mg  11/17/18 22:00  11/17/18 22:54





  Melatonin  PO   5 mg





  HS ELVI   Administration





     





     





     





     


 


Miscellaneous  1 each  11/18/18 22:00  





  Lidoderm Patch Removal  MC   





  DAILY@2200 ELVI   





     





     





     





     


 


Pantoprazole Sodium  40 mg  11/16/18 10:00  11/18/18 10:29





  Protonix -  PO   40 mg





  DAILY ELVI   Administration





     





     





     





     


 


Valsartan  160 mg  11/16/18 15:45  11/18/18 10:29





  Diovan -  PO   160 mg





  DAILY ELVI   Administration





     





     





     





     











ASSESSMENT/PLAN:


Patient is a 63 year old male with a significant past medical history of 

hypertension, hepatitis c, bilateral lower extremity edema, polysubstance abuse

, IVDU x 20 years ago, none recent, referred to [facility] for detox of opiate 

use.  Reports 4-5 bags via inhalation.  Patient was at detox at [facility] but 

was sent to the ER after he c/o of crushing chest pain and became lethargic and 

diaphoretic.  He was given Nitroglycerin 0.4mg x 2 sublingual but pain did not 

subside. An EKG at [facility] was reported to have t wave abnormality with 

possible inferolateral ischemia.   While he was in the Ed he c/o of chest pain, 

nausea, vomiting. Abdomen CT shows non specific mosaic opacity of the lungs 

with possible groundglass opacity in the lingula.  Platelike atelectasis 

changes.  A left renal mass was also found.  


EKG shows new t wave inversion in inferior leads.  Patient being admitted to 

rule out ACS.  He is also noted to have an elevated WBC.  septic workup 

initiated.


-------


problem list


bilateral lower ext edema


polysubstance abuse


IVDU


Heroin use


Renal mass


sepsis


rule out acs


---------


Imaging:


abd/ct/pelvis: kidneys with large peripherally calcified left posterior renal 

mass measuring 8.8 cm in ap dimension 9.2cm in width. tumor lesion suspected.


renal u/s a 9.4 cm left posterior renal cortical lesion seen with prominent 

peripheral rim calcification.  


CT shows non specific mosaic opacity of the lungs with possible groundglass 

opacity in the lingula.  Platelike atelectasis changes.  A left renal mass was 

also found. 


ct chest/abd with contrast: no pulmonary masses or nodules.  9.6cm herorogenous 

cystic mass to lower pole left kidney. borderline enlarged mediastinal lymph 

nodes. 





ID: 


Meets SIRS criteria/early sepsis/possible pneumonia


Presented from [facility] for shortness of breath, chronic cough, fatigue. chest 

pain. 


abd/pelvis ct with non specific mosaic opacity of the lungs with possible 

groundglass opacity in the lingula


Leukocytosis improving 20>8.3. since admission.  On Azactam per ID (day #3)


Blood and urine cultures negative to date.


ct chest/abd with contrast: no pulmonary masses or nodules.  9.6cm herorogenous 

cystic mass to lower pole left kidney. borderline enlarged mediastinal lymph 

nodes. 





Card: 


Chest pain/shortness of breath, resolved.  For stress test Monday.


Hypertension.  BP improving.  urine negative for cocaine, +for opiates (pt on 

metadone).


on Coreq bid.  diovan 160mg daily, amlodopine, hctz 25mg.





Psyche: 


Polysubstance abuse, heroin abuse


On Methadone taper, given last taper dose yesterday





Renal/: 


Large Calcified left posterior renal mass noted on imaging


ultrasound noted.  for MRI, but patient unable to tolerate MRI today.  





Heme:  


Hemoconcentration.  improving with IVF.  daily cbc. 





fen


ivf


start low salt diet as tolerated


monitor electrolytes





prophy


heparin tid





Visit type





- Emergency Visit


Emergency Visit: Yes


ED Registration Date: 11/16/18


Care time: The patient presented to the Emergency Department on the above date 

and was hospitalized for further evaluation of their emergent condition.





- New Patient


This patient is new to me today: No





- Critical Care


Critical Care patient: No





- Discharge Referral


Referred to [facility] No

## 2018-11-18 NOTE — PN
Progress Note (short form)





- Note


Progress Note: 





substance abuse in detox


s/p chest pain being eval


incidental left renal mass


prerenal azotemia








Active Medications





Acetaminophen (Tylenol -)  650 mg PO Q6H PRN


   PRN Reason: PAIN LEVEL 7 - 10


Albuterol/Ipratropium (Duoneb -)  1 amp NEB RQ4H [facility]


   Last Admin: 11/18/18 15:46 Dose:  Not Given


Amlodipine Besylate (Norvasc -)  10 mg PO DAILY [facility]


   Last Admin: 11/18/18 10:29 Dose:  10 mg


Carvedilol (Coreg -)  6.25 mg PO BID [facility]


   Last Admin: 11/18/18 10:30 Dose:  6.25 mg


Heparin Sodium (Porcine) (Heparin -)  5,000 unit SQ TID [facility]


   Last Admin: 11/18/18 17:11 Dose:  Not Given


Hydrochlorothiazide (Hctz -)  25 mg PO DAILY [facility]


   Last Admin: 11/18/18 10:30 Dose:  25 mg


Aztreonam 1 gm/ Dextrose  50 mls @ 100 mls/hr IVPB Q8H-IV ELVI; Protocol


   Last Admin: 11/18/18 17:11 Dose:  100 mls/hr


Sodium Chloride (Normal Saline -)  1,000 mls @ 100 mls/hr IV ASDIR [facility]


   Last Admin: 11/17/18 15:02 Dose:  100 mls/hr


Ketorolac Tromethamine (Toradol)  10 mg PO Q6HPO [facility]


   Stop: 11/23/18 16:14


   Last Admin: 11/18/18 17:12 Dose:  10 mg


Lidocaine (Lidoderm Patch -)  1 patch TP DAILY [facility]


   Last Admin: 11/18/18 17:11 Dose:  1 patch


Melatonin (Melatonin)  5 mg PO HS [facility]


   Last Admin: 11/17/18 22:54 Dose:  5 mg


Miscellaneous (Lidoderm Patch Removal)  1 each MC DAILY@2200 [facility]


Pantoprazole Sodium (Protonix -)  40 mg PO DAILY [facility]


   Last Admin: 11/18/18 10:29 Dose:  40 mg


Valsartan (Diovan -)  160 mg PO DAILY [facility]


   Last Admin: 11/18/18 10:29 Dose:  160 mg





 Last Vital Signs











Temp Pulse Resp BP Pulse Ox


 


 98 F   81   20   98/70   98 


 


 11/18/18 14:00  11/18/18 14:00  11/18/18 14:00  11/18/18 14:00  11/18/18 09:00








c/o loose bms, no definite withdrawal sx





alert in nad


Lungs clear


Heart reg


Abd soft nontender


ext no edema











 











  11/16/18 11/17/18 11/18/18





  11:50 05:30 09:05


 


Sodium  134 L  134 L  136


 


Potassium  3.9  3.8  3.9


 


BUN  20 H  19 H  22 H


 


Creatinine  1.2  1.3  1.4 H


 


Albumin  3.2 L  3.5  3.4











IMP- artemio


prerenal


hemoconcentration


he's fearful of drinking more fluids because he's having loose bms

















Plan- continue increased hydration iv

## 2018-11-18 NOTE — PN
Progress Note, Physician


History of Present Illness: 





stable


no new issues


wants to go home





- Current Medication List


Current Medications: 


Active Medications





Albuterol/Ipratropium (Duoneb -)  1 amp NEB RQ4H [facility]


   Last Admin: 11/18/18 07:45 Dose:  1 amp


Amlodipine Besylate (Norvasc -)  10 mg PO DAILY [facility]


   Last Admin: 11/18/18 10:29 Dose:  10 mg


Carvedilol (Coreg -)  6.25 mg PO BID [facility]


   Last Admin: 11/18/18 10:30 Dose:  6.25 mg


Heparin Sodium (Porcine) (Heparin -)  5,000 unit SQ TID [facility]


   Last Admin: 11/18/18 06:33 Dose:  5,000 unit


Hydrochlorothiazide (Hctz -)  25 mg PO DAILY [facility]


   Last Admin: 11/18/18 10:30 Dose:  25 mg


Aztreonam 1 gm/ Dextrose  50 mls @ 100 mls/hr IVPB Q8H-IV [facility]; Protocol


   Last Admin: 11/18/18 03:06 Dose:  100 mls/hr


Sodium Chloride (Normal Saline -)  1,000 mls @ 100 mls/hr IV ASDIR [facility]


   Last Admin: 11/17/18 15:02 Dose:  100 mls/hr


Melatonin (Melatonin)  5 mg PO HS [facility]


   Last Admin: 11/17/18 22:54 Dose:  5 mg


Pantoprazole Sodium (Protonix -)  40 mg PO DAILY [facility]


   Last Admin: 11/18/18 10:29 Dose:  40 mg


Valsartan (Diovan -)  160 mg PO DAILY [facility]


   Last Admin: 11/18/18 10:29 Dose:  160 mg











- Objective


Vital Signs: 


 Vital Signs











Temperature  98 F   11/18/18 10:00


 


Pulse Rate  82   11/18/18 10:00


 


Respiratory Rate  20   11/18/18 10:00


 


Blood Pressure  144/76   11/18/18 10:00


 


O2 Sat by Pulse Oximetry (%)  98   11/17/18 20:45











Constitutional: Yes: No Distress, Calm, Obese


Cardiovascular: Yes: Regular Rate and Rhythm


Respiratory: Yes: Regular, On Nasal O2


Gastrointestinal: Yes: Normal Bowel Sounds, Soft


Musculoskeletal: Yes: WNL


Extremities: Yes: WNL


Neurological: Yes: Alert, Oriented


Psychiatric: Yes: Alert


Labs: 


 CBC, BMP





 11/18/18 09:05 





 11/18/18 09:05 





 INR, PTT











INR  1.20  (0.83-1.09)  H  11/15/18  08:33    














Assessment/Plan





63 year old male with a significant past medical history of hypertension, 

hepatitis c, bilateral lower extremity edema, polysubstance abuse, IVDU x 20 

years ago, none recent, referred to [facility] for detox of opiate use.  Reports 

4-5 bags via inhalation.  Patient was at detox at [facility] but was sent to the 

ER after he c/o of crushing chest pain and became lethargic and diaphoretic.  

He was given Nitroglycerin 0.4mg x 2 sublingual but pain did not subside. 





problem list





bilateral lower ext edema


polysubstance abuse


IVDU


Heroin use


Renal mass


sepsis








plan


will start patient on abx


rest continue current mgmt


imaging studies


as per urology


rest s per the team

## 2018-11-18 NOTE — PN
Progress Note, Physician


History of Present Illness: 





No CV complaints


No chest pain or dyspnea


Tele: Sinus tach





- Current Medication List


Current Medications: 


Active Medications





Albuterol/Ipratropium (Duoneb -)  1 amp NEB RQ4H [facility]


   Last Admin: 11/18/18 07:45 Dose:  1 amp


Amlodipine Besylate (Norvasc -)  10 mg PO DAILY [facility]


   Last Admin: 11/17/18 10:05 Dose:  10 mg


Carvedilol (Coreg -)  3.125 mg PO BID [facility]


   Last Admin: 11/17/18 22:54 Dose:  3.125 mg


Heparin Sodium (Porcine) (Heparin -)  5,000 unit SQ TID [facility]


   Last Admin: 11/18/18 06:33 Dose:  5,000 unit


Hydrochlorothiazide (Hctz -)  25 mg PO DAILY [facility]


   Last Admin: 11/17/18 10:05 Dose:  25 mg


Aztreonam 1 gm/ Dextrose  50 mls @ 100 mls/hr IVPB Q8H-IV [facility]; Protocol


   Last Admin: 11/18/18 03:06 Dose:  100 mls/hr


Sodium Chloride (Normal Saline -)  1,000 mls @ 100 mls/hr IV ASDIR [facility]


   Last Admin: 11/17/18 15:02 Dose:  100 mls/hr


Melatonin (Melatonin)  5 mg PO HS [facility]


   Last Admin: 11/17/18 22:54 Dose:  5 mg


Pantoprazole Sodium (Protonix -)  40 mg PO DAILY [facility]


   Last Admin: 11/17/18 10:05 Dose:  40 mg


Valsartan (Diovan -)  160 mg PO DAILY [facility]


   Last Admin: 11/17/18 10:05 Dose:  160 mg











- Objective


Vital Signs: 


 Vital Signs











Temperature  97.4 F L  11/18/18 06:30


 


Pulse Rate  84   11/18/18 06:30


 


Respiratory Rate  20   11/18/18 06:30


 


Blood Pressure  139/84   11/18/18 06:30


 


O2 Sat by Pulse Oximetry (%)  98   11/17/18 20:45











Constitutional: Yes: Well Nourished, No Distress


Eyes: Yes: WNL


HENT: Yes: WNL


Neck: Yes: WNL


Cardiovascular: Yes: Regular Rate and Rhythm


Respiratory: Yes: CTA Bilaterally


Gastrointestinal: Yes: Normal Bowel Sounds


Musculoskeletal: Yes: WNL


Extremities: Yes: WNL


Edema: Yes


Labs: 


 CBC, BMP





 11/18/18 09:05 





 11/18/18 09:05 





 INR, PTT











INR  1.20  (0.83-1.09)  H  11/15/18  08:33    














Assessment/Plan





chest pain, LV systolic dysfunction:


-per reports occurred in setting of detox from opiate abuse


-trop 0.04 x 2-->0.8 is NOT c/w NSTEMI


-ECG with no ischemic changes vs baseline LVH assctd repol abnormalities


-CXR x 2 without findings of CHF. LV hypokinesis is diffuse, ? new vs old. pt 

at risk for hypertensive CMP given his bp's here


-check U tox (+) opiates and methadone, negative for cocaine.  Will start low 

dose Coreg


-denies sob, appears euvolemic--no lasix


-needs inpt pharm vasodilator stress test to r/o isch cmp once bp better 

controlled--? monday


-11/18:  Will increase coreg to 6.25mg PO BID





HTN:


-bp moderately elevated today


-home meds (amlod 10, hctz 25) have been resumed here


-On valsartan (also for low EF)


-Increased carvedilol 6.25mg PO BID 





leukocytosis:


-afeb


-nonspecific lung findings on CT scan


-infectious w/u ongoing, per hospitalist





renal mass:


-suspected tumor radiographically


-w/u per renal/

## 2018-11-19 VITALS — SYSTOLIC BLOOD PRESSURE: 154 MMHG | TEMPERATURE: 97.8 F | HEART RATE: 72 BPM | DIASTOLIC BLOOD PRESSURE: 78 MMHG

## 2018-11-19 LAB
ALBUMIN SERPL-MCNC: 3.2 G/DL (ref 3.4–5)
ALP SERPL-CCNC: 67 U/L (ref 45–117)
ALT SERPL-CCNC: 42 U/L (ref 13–61)
ANION GAP SERPL CALC-SCNC: 10 MMOL/L (ref 8–16)
AST SERPL-CCNC: 61 U/L (ref 15–37)
BASOPHILS # BLD: 0.7 % (ref 0–2)
BILIRUB SERPL-MCNC: 1.4 MG/DL (ref 0.2–1)
BUN SERPL-MCNC: 28 MG/DL (ref 7–18)
CALCIUM SERPL-MCNC: 8.8 MG/DL (ref 8.5–10.1)
CHLORIDE SERPL-SCNC: 101 MMOL/L (ref 98–107)
CO2 SERPL-SCNC: 26 MMOL/L (ref 21–32)
CREAT SERPL-MCNC: 1.4 MG/DL (ref 0.55–1.3)
DEPRECATED RDW RBC AUTO: 14.9 % (ref 11.9–15.9)
EOSINOPHIL # BLD: 1.3 % (ref 0–4.5)
GLUCOSE SERPL-MCNC: 86 MG/DL (ref 74–106)
HCT VFR BLD CALC: 50.7 % (ref 35.4–49)
HGB BLD-MCNC: 16.3 GM/DL (ref 11.7–16.9)
LYMPHOCYTES # BLD: 29.2 % (ref 8–40)
MAGNESIUM SERPL-MCNC: 2.4 MG/DL (ref 1.8–2.4)
MCH RBC QN AUTO: 27.4 PG (ref 25.7–33.7)
MCHC RBC AUTO-ENTMCNC: 32.2 G/DL (ref 32–35.9)
MCV RBC: 85.3 FL (ref 80–96)
MONOCYTES # BLD AUTO: 14.6 % (ref 3.8–10.2)
NEUTROPHILS # BLD: 54.2 % (ref 42.8–82.8)
PLATELET # BLD AUTO: 161 K/MM3 (ref 134–434)
PMV BLD: 7.4 FL (ref 7.5–11.1)
POTASSIUM SERPLBLD-SCNC: 4 MMOL/L (ref 3.5–5.1)
PROT SERPL-MCNC: 6.6 G/DL (ref 6.4–8.2)
RBC # BLD AUTO: 5.94 M/MM3 (ref 4–5.6)
SODIUM SERPL-SCNC: 137 MMOL/L (ref 136–145)
WBC # BLD AUTO: 6.7 K/MM3 (ref 4–10)

## 2018-11-19 RX ADMIN — LIDOCAINE SCH PATCH: 50 PATCH TOPICAL at 13:05

## 2018-11-19 RX ADMIN — SODIUM CHLORIDE SCH: 9 INJECTION, SOLUTION INTRAVENOUS at 14:16

## 2018-11-19 RX ADMIN — CARVEDILOL SCH MG: 6.25 TABLET, FILM COATED ORAL at 09:30

## 2018-11-19 RX ADMIN — HEPARIN SODIUM SCH UNIT: 5000 INJECTION, SOLUTION INTRAVENOUS; SUBCUTANEOUS at 13:46

## 2018-11-19 RX ADMIN — HEPARIN SODIUM SCH UNIT: 5000 INJECTION, SOLUTION INTRAVENOUS; SUBCUTANEOUS at 06:07

## 2018-11-19 RX ADMIN — AZTREONAM SCH MLS/HR: 1 INJECTION, POWDER, LYOPHILIZED, FOR SOLUTION INTRAMUSCULAR; INTRAVENOUS at 01:14

## 2018-11-19 RX ADMIN — PANTOPRAZOLE SODIUM SCH MG: 40 TABLET, DELAYED RELEASE ORAL at 09:30

## 2018-11-19 RX ADMIN — IPRATROPIUM BROMIDE AND ALBUTEROL SULFATE SCH: .5; 3 SOLUTION RESPIRATORY (INHALATION) at 15:47

## 2018-11-19 RX ADMIN — IPRATROPIUM BROMIDE AND ALBUTEROL SULFATE SCH: .5; 3 SOLUTION RESPIRATORY (INHALATION) at 11:54

## 2018-11-19 RX ADMIN — IPRATROPIUM BROMIDE AND ALBUTEROL SULFATE SCH AMP: .5; 3 SOLUTION RESPIRATORY (INHALATION) at 07:10

## 2018-11-19 RX ADMIN — IPRATROPIUM BROMIDE AND ALBUTEROL SULFATE SCH: .5; 3 SOLUTION RESPIRATORY (INHALATION) at 04:10

## 2018-11-19 RX ADMIN — VALSARTAN SCH MG: 160 TABLET, FILM COATED ORAL at 09:30

## 2018-11-19 RX ADMIN — AZTREONAM SCH MLS/HR: 1 INJECTION, POWDER, LYOPHILIZED, FOR SOLUTION INTRAMUSCULAR; INTRAVENOUS at 14:21

## 2018-11-19 RX ADMIN — KETOROLAC TROMETHAMINE SCH MG: 10 TABLET, FILM COATED ORAL at 14:11

## 2018-11-19 RX ADMIN — SODIUM CHLORIDE SCH MLS/HR: 9 INJECTION, SOLUTION INTRAVENOUS at 06:08

## 2018-11-19 RX ADMIN — HYDROCHLOROTHIAZIDE SCH MG: 25 TABLET ORAL at 13:05

## 2018-11-19 RX ADMIN — KETOROLAC TROMETHAMINE SCH MG: 10 TABLET, FILM COATED ORAL at 06:07

## 2018-11-19 RX ADMIN — KETOROLAC TROMETHAMINE SCH MG: 10 TABLET, FILM COATED ORAL at 00:11

## 2018-11-19 RX ADMIN — IPRATROPIUM BROMIDE AND ALBUTEROL SULFATE SCH: .5; 3 SOLUTION RESPIRATORY (INHALATION) at 00:09

## 2018-11-19 RX ADMIN — AMLODIPINE BESYLATE SCH MG: 10 TABLET ORAL at 09:30

## 2018-11-19 RX ADMIN — SODIUM CHLORIDE SCH MLS/HR: 9 INJECTION, SOLUTION INTRAVENOUS at 13:18

## 2018-11-19 NOTE — PN
Progress Note, Physician


History of Present Illness: 





stable


ct chest noted


patient for perfusion scan


wants to go home





- Current Medication List


Current Medications: 


Active Medications





Acetaminophen (Tylenol -)  650 mg PO Q6H PRN


   PRN Reason: PAIN LEVEL 7 - 10


Albuterol/Ipratropium (Duoneb -)  1 amp NEB RQ4H [facility]


   Last Admin: 11/19/18 11:54 Dose:  Not Given


Amlodipine Besylate (Norvasc -)  10 mg PO DAILY [facility]


   Last Admin: 11/19/18 09:30 Dose:  10 mg


Carvedilol (Coreg -)  6.25 mg PO BID [facility]


   Last Admin: 11/19/18 09:30 Dose:  6.25 mg


Heparin Sodium (Porcine) (Heparin -)  5,000 unit SQ TID [facility]


   Last Admin: 11/19/18 06:07 Dose:  5,000 unit


Hydrochlorothiazide (Hctz -)  25 mg PO DAILY [facility]


   Last Admin: 11/18/18 10:30 Dose:  25 mg


Aztreonam 1 gm/ Dextrose  50 mls @ 100 mls/hr IVPB Q8H-IV [facility]; Protocol


   Last Admin: 11/19/18 01:14 Dose:  100 mls/hr


Sodium Chloride (Normal Saline -)  1,000 mls @ 100 mls/hr IV ASDIR [facility]


   Last Admin: 11/19/18 06:08 Dose:  100 mls/hr


Ketorolac Tromethamine (Toradol)  10 mg PO Q6HPO [facility]


   Stop: 11/23/18 16:14


   Last Admin: 11/19/18 06:07 Dose:  10 mg


Lidocaine (Lidoderm Patch -)  1 patch TP DAILY [facility]


   Last Admin: 11/18/18 17:11 Dose:  1 patch


Melatonin (Melatonin)  10 mg PO HS [facility]


   Last Admin: 11/18/18 21:49 Dose:  10 mg


Miscellaneous (Lidoderm Patch Removal)  1 each MC DAILY@2200 [facility]


   Last Admin: 11/18/18 21:49 Dose:  1 each


Pantoprazole Sodium (Protonix -)  40 mg PO DAILY [facility]


   Last Admin: 11/19/18 09:30 Dose:  40 mg


Valsartan (Diovan -)  160 mg PO DAILY [facility]


   Last Admin: 11/19/18 09:30 Dose:  160 mg











- Objective


Vital Signs: 


 Vital Signs











Temperature  97.1 F L  11/19/18 09:00


 


Pulse Rate  70   11/19/18 09:00


 


Respiratory Rate  18   11/19/18 09:00


 


Blood Pressure  142/90   11/19/18 09:00


 


O2 Sat by Pulse Oximetry (%)  98   11/19/18 09:00











Constitutional: Yes: No Distress, Calm


Cardiovascular: Yes: Regular Rate and Rhythm


Respiratory: Yes: Regular, CTA Bilaterally


Gastrointestinal: Yes: Normal Bowel Sounds, Soft


Musculoskeletal: Yes: WNL


Extremities: Yes: WNL


Neurological: Yes: Alert, Oriented


Psychiatric: Yes: Alert, Oriented


Labs: 


 CBC, BMP





 11/19/18 05:30 





 11/19/18 05:30 





 INR, PTT











INR  1.20  (0.83-1.09)  H  11/15/18  08:33    














Assessment/Plan





63 year old male with a significant past medical history of hypertension, 

hepatitis c, bilateral lower extremity edema, polysubstance abuse, IVDU x 20 

years ago, 


problem list





bilateral lower ext edema


polysubstance abuse


IVDU


Heroin use


Renal mass


sepsis


leukocytosis





patient looks comfortable,





plan


stable


can go home on levaquin dose d/w the team


for another couple of day


will need regular follow up with cardio and urology

## 2018-11-19 NOTE — PN
Progress Note (short form)





- Note


Progress Note: 


s: no chest pain, palps, dizzy, lightheadedness. wants to go home.








 Current Medications





Acetaminophen (Tylenol -)  650 mg PO Q6H PRN


   PRN Reason: PAIN LEVEL 7 - 10


Albuterol/Ipratropium (Duoneb -)  1 amp NEB RQ4H [facility]


   Last Admin: 11/19/18 15:47 Dose:  Not Given


Amlodipine Besylate (Norvasc -)  10 mg PO DAILY [facility]


   Last Admin: 11/19/18 09:30 Dose:  10 mg


Carvedilol (Coreg -)  6.25 mg PO BID [facility]


   Last Admin: 11/19/18 09:30 Dose:  6.25 mg


Heparin Sodium (Porcine) (Heparin -)  5,000 unit SQ TID [facility]


   Last Admin: 11/19/18 13:46 Dose:  5,000 unit


Hydrochlorothiazide (Hctz -)  25 mg PO DAILY [facility]


   Last Admin: 11/19/18 13:05 Dose:  25 mg


Levofloxacin (Levaquin -)  250 mg PO DAILY@0600 [facility]


   Last Admin: 11/19/18 13:45 Dose:  250 mg


Lidocaine (Lidoderm Patch -)  1 patch TP DAILY [facility]


   Last Admin: 11/19/18 13:05 Dose:  1 patch


Melatonin (Melatonin)  10 mg PO HS [facility]


   Last Admin: 11/18/18 21:49 Dose:  10 mg


Miscellaneous (Lidoderm Patch Removal)  1 each MC DAILY@2200 [facility]


   Last Admin: 11/18/18 21:49 Dose:  1 each


Pantoprazole Sodium (Protonix -)  40 mg PO DAILY [facility]


   Last Admin: 11/19/18 09:30 Dose:  40 mg


Valsartan (Diovan -)  160 mg PO DAILY [facility]


   Last Admin: 11/19/18 09:30 Dose:  160 mg

















- Objective


Vital Signs: 


  Vital Signs











 Period  Temp  Pulse  Resp  BP Sys/Menon  Pulse Ox


 


 Last 24 Hr  97.1 F-98 F  58-78  18-20  125-164/79-90  95-98














Constitutional: Yes: Well Nourished, No Distress


Eyes: Yes: WNL


HENT: Yes: WNL


Neck: Yes: WNL


Cardiovascular: Yes: Regular Rate and Rhythm


Respiratory: Yes: CTA Bilaterally


Gastrointestinal: Yes: Normal Bowel Sounds


Musculoskeletal: Yes: WNL


Extremities: Yes: WNL


Edema: Yes





tele: sinus tachycardia





echo 11/2018 tds, lv function moderately to severely reduced





mibi 11/2018 small apical reversible defect c/w mild intensity ischemia, mod 

zone of inferolateral fixed defect c/w diaphragmatic attenuation, severely 

reduced LV function with global hypokinesis and EF 25%





Assessment/Plan





chest pain, LV systolic dysfunction:


-per reports occurred in setting of detox from opiate abuse


-trop 0.04 x 2-->0.08 is not c/w ACS


-ECG with no ischemic changes vs baseline LVH assctd repol abnormalities


-CXR x 2 without findings of CHF. LV hypokinesis is diffuse, ? new vs old. pt 

at risk for hypertensive CMP given his bp's here


-denies sob, appears euvolemic--no lasix


- stress test shows mild apical ischemia, EF 25%


- would recommend cardiac cath, however patient declines to do this now.  

reasonable to defer given history of noncompliance and would need to be 

compliant with DAPT after procedure, discussed with patient. follow up in 1-2 

weeks





HTN:


-bp improving


-home meds (amlod 10, hctz 25) have been resumed here


-On valsartan (also for low EF)


-Increased carvedilol 6.25mg PO BID, conitnue





leukocytosis:


-afeb


-nonspecific lung findings on CT scan


-infectious w/u ongoing, per hospitalist





renal mass:


-suspected tumor radiographically


-w/u per renal/

## 2018-11-19 NOTE — DS
Physical Exam: 


SUBJECTIVE: Patient seen and examined at the bedside. 





ambulating hallways, in no acute distress.  tolerating room air.  stable.  

wants to go home and follow up outpatient. 








OBJECTIVE:





 Vital Signs











 Period  Temp  Pulse  Resp  BP Sys/Menon  Pulse Ox


 


 Last 24 Hr  97.1 F-98.1 F  58-78  18-20  125-164/79-90  95-98








PHYSICAL EXAM





GENERAL: Awake, alert, oriented x 3


HEAD: Normal with no signs of trauma, congestion noted


EYES: Pupils equal, round and reactive to light


EARS, NOSE, THROAT: clear drainage from nares, congested.


NECK: Normal range of motion, supple without lymphadenopathy, JVD, or masses.


LUNGS: diminished bilaterally


HEART: Regular rate and rhythm


ABDOMEN: Soft, distended


MUSCULOSKELETAL: No CVA tenderness.


UPPER EXTREMITIES: . No peripheral edema.


LOWER EXTREMITIES: swelling, erythema, inflammation, severe bilateral lower ext 

edema w/induration, dry skin , hyperkeratotic plaques. No open areas.  

bilateral lower ext edema. negative for dvt


NEUROLOGICAL:  fatigue,calm, cooperative


PSYCHIATRIC: Cooperative





LABS


 Laboratory Results - last 24 hr











  11/19/18 11/19/18





  05:30 05:30


 


WBC  6.7 


 


RBC  5.94 H 


 


Hgb  16.3 


 


Hct  50.7 H 


 


MCV  85.3 


 


MCH  27.4 


 


MCHC  32.2 


 


RDW  14.9 


 


Plt Count  161 


 


MPV  7.4 L 


 


Absolute Neuts (auto)  3.6 


 


Neutrophils %  54.2 


 


Lymphocytes %  29.2 


 


Monocytes %  14.6 H 


 


Eosinophils %  1.3 


 


Basophils %  0.7 


 


Nucleated RBC %  0 


 


Sodium   137


 


Potassium   4.0


 


Chloride   101


 


Carbon Dioxide   26


 


Anion Gap   10


 


BUN   28 H


 


Creatinine   1.4 H


 


Creat Clearance w eGFR   51.02


 


Random Glucose   86


 


Calcium   8.8


 


Magnesium   2.4


 


Total Bilirubin   1.4 H


 


AST   61 H


 


ALT   42


 


Alkaline Phosphatase   67


 


Total Protein   6.6


 


Albumin   3.2 L











HOSPITAL COURSE:





Date of Admission:11/16/18


Date of Discharge: 11/19/18





ASSESSMENT/PLAN:


Patient is a 63 year old male with a significant past medical history of 

hypertension, hepatitis c, bilateral lower extremity edema, polysubstance abuse

, IVDU x 20 years ago, none recent, referred to [facility] for detox of opiate 

use.  Reports 4-5 bags via inhalation.  Patient was at detox at [facility] but 

was sent to the ER after he c/o of crushing chest pain and became lethargic and 

diaphoretic.  He was given Nitroglycerin 0.4mg x 2 sublingual but pain did not 

subside. An EKG at [facility] was reported to have t wave abnormality with 

possible inferolateral ischemia.   While he was in the Ed he c/o of chest pain, 

nausea, vomiting. Abdomen CT shows non specific mosaic opacity of the lungs 

with possible groundglass opacity in the lingula.  Platelike atelectasis 

changes.  A left renal mass was also found.  


EKG shows new t wave inversion in inferior leads.  Patient being admitted to 

rule out ACS.  He is also noted to have an elevated WBC.  septic workup 

initiated.





Imaging:


abd/ct/pelvis: kidneys with large peripherally calcified left posterior renal 

mass measuring 8.8 cm in ap dimension 9.2cm in width. tumor lesion suspected.


renal u/s a 9.4 cm left posterior renal cortical lesion seen with prominent 

peripheral rim calcification.  


CT shows non specific mosaic opacity of the lungs with possible groundglass 

opacity in the lingula.  Platelike atelectasis changes.  A left renal mass was 

also found. 


ct chest/abd with contrast: no pulmonary masses or nodules.  9.6cm herorogenous 

cystic mass to lower pole left kidney. borderline enlarged mediastinal lymph 

nodes. 





ID: 


Meets SIRS criteria/early sepsis/possible pneumonia. resolved. 


Presented from [facility] for shortness of breath, chronic cough, fatigue. chest 

pain. 


abd/pelvis ct with non specific mosaic opacity of the lungs with possible 

groundglass opacity in the lingula


Leukocytosis improving 20>8.3. since admission.  stop IV antibiotics and send 

home with Levaquin 250mg daily x 5 more days


Blood and urine cultures negative


ct chest/abd with contrast: no pulmonary masses or nodules.  9.6cm herorogenous 

cystic mass to lower pole left kidney. borderline enlarged mediastinal lymph 

nodes. 





Card: 


Chest pain/shortness of breath, resolved.


stress test findings discussed with patient, patient to follow up with Dr. Evans outpatient for recommended stress test


Hypertension.  BP improved.  urine negative for cocaine, +for opiates (pt on 

metadone).


on Coreq bid.  diovan 160mg daily, amlodopine, hctz 25mg to continue as an 

outpatient.  medications called in.





Psyche: 


Polysubstance abuse, heroin abuse


On Methadone taper, completed taper. patient refusing to return to [facility]. 





Renal/: 


Large Calcified left posterior renal mass noted on imaging


ultrasound noted.  for MRI, but patient unable to tolerate.  patient to follow 

up with urology outpatient. 





Heme:  


Hemoconcentration.  improving with IVF.  follow up outpatient. 





discharge home. 





Minutes to complete discharge: 60





Discharge Summary


Reason For Visit: MASS OF LEFT KIDNEY, LEUKOCYTOSIS, CHEST PAIN


Current Active Problems





CKD (chronic kidney disease) (Acute)


Chest pain in adult (Acute)


Left renal mass (Acute)


Leukocytosis, unspecified (Acute)








Condition: Stable





- Instructions


Diet, Activity, Other Instructions: 


Mr. Kim





You were admitted on 11/16/2018 for possible pneumonia and chest pain  Here are 

our recommendations:





Possible Pneumonia


We treated you for pneumonia and will be sending you home on antibiotics orally 

of Levaquin 250mg once per day for 5 more days.  Please continue the full 

course without skipping doses. Start tomorrow 11/20/2018 and finish on 11/25/ 2018.





Chest pain/Hypertension.


stress test shows mild apical ischemia, EF 25%.  You recommend a cardiac cath.  

Please follow up with Dr. Evans in 2 weeks.





Hypertension:  Note new medications:


Medications:


Coreq 6.25 twice per day at 8am and 8pm


Diovan 160mg daily at 8am


Amlodopoine 10mg daily (you have the combo pill at home, continue to take your 

home pill), take daily at 8am


Hydroclorothiazide 25mg daily at 8am





Psyche: 


Polysubstance abuse, heroin abuse - your completed your detox during 

hospitalization.





Renal/: 


Large Calcified left posterior renal mass noted on imaging - Please see Dr. Browning.  Please call his office for an appointment. 





I have referred you to a primary care doctor.  Please call and make an 

appointment and bring your insurance information on the day of your visit. 





Please call me with any questions. Thank you for allowing us to care for you. 





Helene ZhangAd[facility] NP


[phone]


[facility]


Referrals: 


Olamide Nuno MD [Staff Physician] - 1 Week (new primary care doctor referral

)


Porfirio Kebede MD [Staff Physician] - 


Meseret Velásquez MD [Staff Physician] - 2 Weeks


Disposition: HOME





- Home Medications


Comprehensive Discharge Medication List: 


Ambulatory Orders





Amlodipine/Atorvastatin [Amlodipine-Atorvast 10-10 mg] 10 mg PO DAILY 04/13/18 


Amlodipine Besylate 10 mg PO DAILY 30 Days #30 tablet 04/17/18 


Hydrochlorothiazide 25 mg PO DAILY 30 Days #30 tablet 04/17/18 








This patient is new to me today: No


Emergency Visit: Yes


ED Registration Date: 11/16/18


Care time: The patient presented to the Emergency Department on the above date 

and was hospitalized for further evaluation of their emergent condition.


Critical Care patient: No





- Discharge Referral


Referred to [facility] No


Physician Referral: David Olivarez MD ([facility])

## 2018-11-19 NOTE — PN
Progress Note, Physician


History of Present Illness: 





Pt seen and examined at bedside. He is awake and alert. He denies shortness of 

breath. 





- Current Medication List


Current Medications: 


Active Medications





Acetaminophen (Tylenol -)  650 mg PO Q6H PRN


   PRN Reason: PAIN LEVEL 7 - 10


Albuterol/Ipratropium (Duoneb -)  1 amp NEB RQ4H [facility]


   Last Admin: 11/19/18 11:54 Dose:  Not Given


Amlodipine Besylate (Norvasc -)  10 mg PO DAILY [facility]


   Last Admin: 11/19/18 09:30 Dose:  10 mg


Carvedilol (Coreg -)  6.25 mg PO BID [facility]


   Last Admin: 11/19/18 09:30 Dose:  6.25 mg


Heparin Sodium (Porcine) (Heparin -)  5,000 unit SQ TID [facility]


   Last Admin: 11/19/18 13:46 Dose:  5,000 unit


Hydrochlorothiazide (Hctz -)  25 mg PO DAILY [facility]


   Last Admin: 11/19/18 13:05 Dose:  25 mg


Sodium Chloride (Normal Saline -)  1,000 mls @ 100 mls/hr IV ASDIR [facility]


   Last Admin: 11/19/18 14:16 Dose:  Not Given


Ketorolac Tromethamine (Toradol)  10 mg PO Q6HPO [facility]


   Stop: 11/23/18 16:14


   Last Admin: 11/19/18 14:11 Dose:  10 mg


Levofloxacin (Levaquin -)  250 mg PO DAILY@0600 [facility]


   Last Admin: 11/19/18 13:45 Dose:  250 mg


Lidocaine (Lidoderm Patch -)  1 patch TP DAILY [facility]


   Last Admin: 11/19/18 13:05 Dose:  1 patch


Melatonin (Melatonin)  10 mg PO HS [facility]


   Last Admin: 11/18/18 21:49 Dose:  10 mg


Miscellaneous (Lidoderm Patch Removal)  1 each MC DAILY@2200 [facility]


   Last Admin: 11/18/18 21:49 Dose:  1 each


Pantoprazole Sodium (Protonix -)  40 mg PO DAILY [facility]


   Last Admin: 11/19/18 09:30 Dose:  40 mg


Valsartan (Diovan -)  160 mg PO DAILY [facility]


   Last Admin: 11/19/18 09:30 Dose:  160 mg











- Objective


Vital Signs: 


 Vital Signs











Temperature  98.1 F   11/19/18 14:00


 


Pulse Rate  74   11/19/18 14:00


 


Respiratory Rate  18   11/19/18 09:00


 


Blood Pressure  137/88   11/19/18 14:00


 


O2 Sat by Pulse Oximetry (%)  98   11/19/18 09:00











Constitutional: Yes: Calm


Eyes: Yes: Conjunctiva Clear


HENT: Yes: Atraumatic


Cardiovascular: Yes: S1, S2


Respiratory: Yes: CTA Bilaterally


Gastrointestinal: Yes: Soft, Abdomen, Obese


Genitourinary: Yes: WNL


Musculoskeletal: Yes: WNL


Edema: Yes


Edema: LLE: Trace, RLE: Trace


Integumentary: Yes: Venous Stasis Changes


Neurological: Yes: Oriented


Psychiatric: Yes: Oriented


Labs: 


 CBC, BMP





 11/19/18 05:30 





 11/19/18 05:30 





 INR, PTT











INR  1.20  (0.83-1.09)  H  11/15/18  08:33    














Problem List





- Problems


(1) CKD (chronic kidney disease)


Code(s): N18.9 - CHRONIC KIDNEY DISEASE, UNSPECIFIED   





(2) Left renal mass


Code(s): N28.89 - OTHER SPECIFIED DISORDERS OF KIDNEY AND URETER   





(3) Essential hypertension


Code(s): I10 - ESSENTIAL (PRIMARY) HYPERTENSION   





Assessment/Plan





 Current Medications











Generic Name Dose Route Start Last Admin





  Trade Name Freq  PRN Reason Stop Dose Admin


 


Acetaminophen  650 mg  11/18/18 16:03  





  Tylenol -  PO   





  Q6H PRN   





  PAIN LEVEL 7 - 10   





     





     





     


 


Albuterol/Ipratropium  1 amp  11/16/18 00:00  11/19/18 11:54





  Duoneb -  NEB   Not Given





  RQ4H [facility]   





     





     





     





     


 


Amlodipine Besylate  10 mg  11/16/18 10:00  11/19/18 09:30





  Norvasc -  PO   10 mg





  DAILY [facility]   Administration





     





     





     





     


 


Carvedilol  6.25 mg  11/18/18 10:15  11/19/18 09:30





  Coreg -  PO   6.25 mg





  BID [facility]   Administration





     





     





     





     


 


Heparin Sodium (Porcine)  5,000 unit  11/16/18 06:00  11/19/18 13:46





  Heparin -  SQ   5,000 unit





  TID [facility]   Administration





     





     





     





     


 


Hydrochlorothiazide  25 mg  11/16/18 10:00  11/19/18 13:05





  Hctz -  PO   25 mg





  DAILY [facility]   Administration





     





     





     





     


 


Sodium Chloride  1,000 mls @ 100 mls/hr  11/16/18 14:11  11/19/18 14:16





  Normal Saline -  IV   Not Given





  ASDIR [facility]   





     





     





     





     


 


Ketorolac Tromethamine  10 mg  11/18/18 16:15  11/19/18 14:11





  Toradol  PO  11/23/18 16:14  10 mg





  Q6HPO [facility]   Administration





     





     





     





     


 


Levofloxacin  250 mg  11/19/18 13:30  11/19/18 13:45





  Levaquin -  PO   250 mg





  DAILY@0600 [facility]   Administration





     





     





     





     


 


Lidocaine  1 patch  11/18/18 16:15  11/19/18 13:05





  Lidoderm Patch -  TP   1 patch





  DAILY [facility]   Administration





     





     





     





     


 


Melatonin  10 mg  11/18/18 22:00  11/18/18 21:49





  Melatonin  PO   10 mg





  HS [facility]   Administration





     





     





     





     


 


Miscellaneous  1 each  11/18/18 22:00  11/18/18 21:49





  Lidoderm Patch Removal  MC   1 each





  DAILY@2200 [facility]   Administration





     





     





     





     


 


Pantoprazole Sodium  40 mg  11/16/18 10:00  11/19/18 09:30





  Protonix -  PO   40 mg





  DAILY [facility]   Administration





     





     





     





     


 


Valsartan  160 mg  11/16/18 15:45  11/19/18 09:30





  Diovan -  PO   160 mg





  DAILY [facility]   Administration





     





     





     





     








Impression


1. CKD


2. renal mass


3. chest pain


4. HTN


5. obesity


6. heroin use





Plan


- will need renal workup for ckd


- urology eval for renal mass


- hold fluids


- stop ketorolac


- avoid nsaids as pt has history of nsaid use


- will follow


- counselled about drug use

## 2018-12-03 ENCOUNTER — HOSPITAL ENCOUNTER (EMERGENCY)
Dept: HOSPITAL 74 - JER | Age: 63
Discharge: HOME | End: 2018-12-03
Payer: COMMERCIAL

## 2018-12-03 VITALS — TEMPERATURE: 97.9 F | HEART RATE: 105 BPM

## 2018-12-03 VITALS — BODY MASS INDEX: 42.8 KG/M2

## 2018-12-03 VITALS — SYSTOLIC BLOOD PRESSURE: 153 MMHG | DIASTOLIC BLOOD PRESSURE: 69 MMHG

## 2018-12-03 DIAGNOSIS — F11.90: ICD-10-CM

## 2018-12-03 DIAGNOSIS — J00: Primary | ICD-10-CM

## 2018-12-03 LAB
ALBUMIN SERPL-MCNC: 3.2 G/DL (ref 3.4–5)
ALP SERPL-CCNC: 81 U/L (ref 45–117)
ALT SERPL-CCNC: 26 U/L (ref 13–61)
ANION GAP SERPL CALC-SCNC: 7 MMOL/L (ref 8–16)
APPEARANCE UR: (no result)
AST SERPL-CCNC: 33 U/L (ref 15–37)
BACTERIA #/AREA URNS HPF: (no result) /HPF
BASOPHILS # BLD: 1 % (ref 0–2)
BILIRUB SERPL-MCNC: 0.5 MG/DL (ref 0.2–1)
BILIRUB UR STRIP.AUTO-MCNC: NEGATIVE MG/DL
BUN SERPL-MCNC: 20 MG/DL (ref 7–18)
CALCIUM SERPL-MCNC: 8.5 MG/DL (ref 8.5–10.1)
CHLORIDE SERPL-SCNC: 106 MMOL/L (ref 98–107)
CO2 SERPL-SCNC: 27 MMOL/L (ref 21–32)
COLOR UR: (no result)
CREAT SERPL-MCNC: 1.4 MG/DL (ref 0.55–1.3)
DEPRECATED RDW RBC AUTO: 15.5 % (ref 11.9–15.9)
EOSINOPHIL # BLD: 2.3 % (ref 0–4.5)
EPITH CASTS URNS QL MICRO: (no result) /HPF
GLUCOSE SERPL-MCNC: 88 MG/DL (ref 74–106)
HCT VFR BLD CALC: 41.1 % (ref 35.4–49)
HGB BLD-MCNC: 14.3 GM/DL (ref 11.7–16.9)
HYALINE CASTS URNS QL MICRO: 17 /LPF
KETONES UR QL STRIP: NEGATIVE
LEUKOCYTE ESTERASE UR QL STRIP.AUTO: (no result)
LYMPHOCYTES # BLD: 31.8 % (ref 8–40)
MCH RBC QN AUTO: 29.3 PG (ref 25.7–33.7)
MCHC RBC AUTO-ENTMCNC: 34.8 G/DL (ref 32–35.9)
MCV RBC: 84.1 FL (ref 80–96)
MONOCYTES # BLD AUTO: 10.7 % (ref 3.8–10.2)
MUCOUS THREADS URNS QL MICRO: (no result)
NEUTROPHILS # BLD: 54.2 % (ref 42.8–82.8)
NITRITE UR QL STRIP: NEGATIVE
PH UR: 5 [PH] (ref 5–8)
PLATELET # BLD AUTO: 144 K/MM3 (ref 134–434)
PMV BLD: 7.5 FL (ref 7.5–11.1)
POTASSIUM SERPLBLD-SCNC: 4.9 MMOL/L (ref 3.5–5.1)
PROT SERPL-MCNC: 6.6 G/DL (ref 6.4–8.2)
PROT UR QL STRIP: NEGATIVE
PROT UR QL STRIP: NEGATIVE
RBC # BLD AUTO: 4.89 M/MM3 (ref 4–5.6)
SODIUM SERPL-SCNC: 140 MMOL/L (ref 136–145)
SP GR UR: 1.01 (ref 1.01–1.03)
UROBILINOGEN UR STRIP-MCNC: NEGATIVE MG/DL (ref 0.2–1)
WBC # BLD AUTO: 5.9 K/MM3 (ref 4–10)

## 2018-12-03 NOTE — PDOC
History of Present Illness





- General


Chief Complaint: Cold Symptoms


Stated Complaint: Pain, Acute


Time Seen by Provider: 12/03/18 14:56


History Source: Patient


Exam Limitations: No Limitations





- History of Present Illness


Initial Comments: 





12/03/18 17:03


Pt is a 64yo M with PMH of heroin use (last used yesterday, snorted 5 times), 

IV drug use (over 20 years ago), HTN, presenting to ED with complaints of 

generalized body aches, joint pains and R thigh pain. Pt says these symptoms 

have been going on for about a week or so after his recent discharge from the 

hospital for pneumonia. Pt says his R thigh hurts most after he has been 

standing for a while. He admits to subjective fevers at home and endorses 

slight headache, congestion and sinus pressure.  Denies cough, chest pain, 

shortness of breath, abdominal pain, n/v/d, cough, urinary symptoms, numbness/

tingling. 





PMD: Bryon


PMH: see hpi


Allergies: PCN


Social: heroin use











Past History





- Past Medical History


Allergies/Adverse Reactions: 


 Allergies











Allergy/AdvReac Type Severity Reaction Status Date / Time


 


bee venom protein (honey bee) Allergy Severe Swelling Verified 12/03/18 15:04


 


penicillin G Allergy Severe Swelling Verified 12/03/18 15:04











Home Medications: 


Ambulatory Orders





Amlodipine Besylate [Norvasc -] 10 mg PO DAILY #0 tablet 11/19/18 


Carvedilol [Coreg -] 6.25 mg PO BID #60 tablet 11/19/18 


Hydrochlorothiazide 25 mg PO DAILY 30 Days #30 tablet 11/19/18 


Valsartan [Diovan] 160 mg PO DAILY #30 tablet 11/19/18 








Anemia: No


Asthma: No


Cancer: No


Cardiac Disorders: No


CVA: No


COPD: No


CHF: No


Diabetes: No


GI Disorders: No


 Disorders: No


HTN: Yes (On meds)


Hypercholesterolemia: No


Kidney Stones: No


Liver Disease: Yes (HEPATITIS C)


Seizures: No


Thyroid Disease: No





- Surgical History


Abdominal Surgery: No


Appendectomy: No


Cardiac Surgery: No


Cholecystectomy: No


Lung Surgery: No


Neurologic Surgery: No


Orthopedic Surgery: No





- Reproductive History


Testicular Surgery: No





- Suicide/Smoking/Psychosocial Hx


Smoking History: Current every day smoker


Have you smoked in the past 12 months: Yes


Number of Cigarettes Smoked Daily: 10


Information on smoking cessation initiated: No


'Breaking Loose' booklet given: 04/13/18


Hx Alcohol Use: No


Drug/Substance Use Hx: Yes (heroin)


Substance Use Type: Heroin, Opiates


Hx Substance Use Treatment: Yes ([facility] on 04/18)





*Physical Exam





- Vital Signs


 Last Vital Signs











Temp Pulse Resp BP Pulse Ox


 


 97.9 F   105 H  18   144/88   95 


 


 12/03/18 15:02  12/03/18 15:02  12/03/18 15:02  12/03/18 15:02  12/03/18 15:02














- Physical Exam


General Appearance: Yes: Nourished, Appropriately Dressed.  No: Apparent 

Distress


HEENT: positive: EOMI, INOCENCIA, TMs Normal, Pharynx Normal.  negative: Scleral 

Icterus (R), Scleral Icterus (L), Pharyngeal Erythema, Tonsillar Exudate, 

Rhinorrhea, Sinus Tenderness, Hearing Grossly Normal


Neck: positive: Trachea midline, Supple.  negative: Lymphadenopathy (R), 

Lymphadenopathy (L)


Respiratory/Chest: positive: Lungs Clear, Normal Breath Sounds.  negative: 

Crackles, Rales, Rhonchi, Stridor, Wheezing


Cardiovascular: positive: Regular Rhythm, Regular Rate, S1, S2.  negative: Edema

, JVD, Murmur


Vascular Pulses: Carotid (R): 2+, Carotid (L): 2+


Gastrointestinal/Abdominal: positive: Normal Bowel Sounds, Soft, Protuberent.  

negative: Guarding, Rebound, Tenderness


Musculoskeletal: positive: Other (R thigh not tender to palpation. ).  negative

: CVA Tenderness, Muscle Spasm


Extremity: positive: Normal Capillary Refill.  negative: Pedal Edema, Swelling, 

Calf Tenderness, Erythema


Integumentary: positive: Dry, Other (dry skin overlying lower extremities 

bilaterally, healed circular lesions on arms, no track marks, no excoriation 

marks).  negative: Erythema, Jaundice, Rash, Swelling


Neurologic: positive: CNs II-XII NML intact, Fully Oriented, Alert, Normal Mood/

Affect, Normal Response, Motor Strength 5/5





Moderate Sedation





- Procedure Monitoring


Vital Signs: 


Procedure Monitoring Vital Signs











Temperature  97.9 F   12/03/18 15:02


 


Pulse Rate  105 H  12/03/18 15:02


 


Respiratory Rate  18   12/03/18 15:02


 


Blood Pressure  144/88   12/03/18 15:02


 


O2 Sat by Pulse Oximetry (%)  95   12/03/18 15:02











ED Treatment Course





- LABORATORY


CBC & Chemistry Diagram: 


 12/03/18 17:35





 12/03/18 17:35





Medical Decision Making





- Medical Decision Making


Pt is a 64yo M with PMH of heroin use (last used yesterday, snorted 5 times), 

IV drug use (over 20 years ago), HTN, presenting to ED with complaints of 

generalized body aches, joint pains and R thigh pain


   Vitals: 


   PE: dry skin, otherwise benign. thigh not tender to palpation





DDx: meningitis, virus, artemio, nephritis


labs ordered





12/03/18 18:19


Cr at baseline 1.4. All other labs wnl. UA showed 2+ blood with 4RBC.  Pt does 

not have acute problem at this time that will require admission. Is taking 

medications as directed. Will set up pt with PMD to follow up with. Advised pt 

to go to rehab. Also given information for nephrology and urology. Advised pt 

to stay hydrated. Pt agreed to plan. 





*DC/Admit/Observation/Transfer


Diagnosis at time of Disposition: 


 Cold








- Referrals


Referrals: 


Olamide Nuno MD [Staff Physician] - 


Jorge Luis Bruce MD [Staff Physician] - 


Lali Ann MD [Staff Physician] - 





- Patient Instructions


Additional Instructions: 


You were seen here today for body aches, back pain and joint pains. Your tests 

show that your kidney function is not where it is supposed to be. I highly 

recommend that you see a nephrologist as well as a urologist. 


You have an appointment with a primary care doctor:


Russell Rogers at [facility] with Dr. Nuno [phone] at 3:

30pm on Wednesday, December 5.





Nephrologist: Dr. Ann/ Dr. Perez [phone]


Urologist: Dr. Bruce [phone]


 


**I highly recommend that you check into going back to Detox**


You can take Tylenol for pain. 


Please come back to the emergency room if pain gets worse, you develop fever, 

you have worsening weakness or if any new concerning symptom develops.





Thank you





- Post Discharge Activity


Forms/Work/School Notes:  Back to Work

## 2018-12-03 NOTE — PDOC
Attending Attestation





- HPI


HPI: 





12/03/18 18:02


 


The patient is a 63 year old male with a significant PMH of heroin abuse, IV 

drug use, and HTN who presents to the emergency department with leg weakness 

and joint days for the past 4 days. Patient states the leg weakness is worse on 

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 

Patient was recently discharged from this hospital for pneumonia. Patient has 

an abdominal CT done at that time which showed a 9.6cm cystic mass mid to lower 

pole of the left kidney, but has not yet followed up. Patient has not followed 

up with a  PCP since discharge. 


 


The patient denies chest pain, shortness of breath, headache and dizziness.


Denies fever, chills, nausea, vomit, diarrhea and constipation.


Denies dysuria, frequency, urgency and hematuria.


 


Allergies: NKA


Past surgical history: None reported. 


Social history: No reported cigarette or alcohol use. 


 


 





- Physicial Exam


PE: 





12/03/18 18:03


ADULT EXAM


GENERAL: Awake, alert, and fully oriented, in no acute distress


HEAD: No signs of trauma


EYES: PERRLA, EOMI, sclera anicteric, conjunctiva clear


ENT: Auricles normal inspection, hearing grossly normal, nares patent, 

oropharynx clear without exudates. Moist mucosa


NECK: Normal ROM, supple, no lymphadenopathy, JVD, or masses


LUNGS: Breath sounds equal, clear to auscultation bilaterally.  No wheezes, and 

no crackles


HEART: Regular rate and rhythm, normal S1 and S2, no murmurs, rubs or gallops


ABDOMEN: Soft, nontender, normoactive bowel sounds.  No guarding, no rebound.  

No masses


EXTREMITIES: Normal range of motion, no edema.  No clubbing or cyanosis. No 

cords, erythema, or tenderness. (+) Chronic pitting edema and chronic venous 

stasis. 


NEUROLOGICAL: Cranial nerves II through XII grossly intact.  Normal speech, 

normal gait


SKIN: Warm, Dry, normal turgor, no rashes or lesions noted.


 








<Christina Villegas - Last Filed: 12/03/18 18:02>





- Resident


Resident Name: Julia Tejada





- ED Attending Attestation


I have performed the following: I have examined & evaluated the patient, The 

case was reviewed & discussed with the resident, I agree w/resident's findings 

& plan, Exceptions are as noted





- Medical Decision Making





12/03/18 18:45


63-year-old male who was discharged several weeks ago after an extensive workup 

and found to have a large renal mass, presents with general malaise and lower 

leg pain.  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 

when he hoists himself up into the truck


His last hospitalization was about 2 weeks ago when he was transferred from 

[facility] for chest pain He was supposed to follow-up with Dr. Alton Bray, 

Dr. Kebede ,Dr. Velásquez  but has not made any appointments.


12/03/18 18:48


63-year-old male who is a  ,lives in his office,( nondomiciled)

. He has remote history  IVDU He did not have prior medical care because of 

lack of insurance.


He recently obtained medical insurance.


While hospitalized several weeks ago for chest pain,  he had an echo done and 

stress test done and multiple consults by specialists while hospitalized


12/03/18 18:56


-PMH significant for HTN,chronic venous  stasis  leg ulcers ,drug abuse and 

recently had CT SCAN that reveal a calcified left renal mass of 8.8cm x  9.2 cm 





labs reviewed and there is no significant change from his prior 


we made an appt for him this Wednesday at 3:30  at the [facility] at [place] 

[place] at [address]


IMP L renal mass, muscle ache/CKI





<Ronda Pederson - Last Filed: 12/03/18 19:12>

## 2018-12-03 NOTE — PDOC
Rapid Medical Evaluation


Chief Complaint: Respiratory


Time Seen by Provider: 12/03/18 14:56


Medical Evaluation: 


 Allergies











Allergy/AdvReac Type Severity Reaction Status Date / Time


 


bee venom protein (honey bee) Allergy Severe Swelling Verified 11/15/18 07:57


 


penicillin G Allergy Severe Swelling Verified 11/15/18 07:57











12/03/18 14:57


]I have performed a brief in-person evaluation of this patient.


The patient presents with a chief complaint of:2 weeks ago was admitted. / 

treated for pneumonia chest pain and body aches, Cont using intermittant Heroin 

snorting 


Pertinent physical exam findings: pale / 


I have ordered the following: CXR, Influenza swab


The patient will proceed to the ED for further evaluation.


12/03/18 14:59





12/03/18 15:00





12/03/18 15:04

## 2019-03-05 ENCOUNTER — HOSPITAL ENCOUNTER (INPATIENT)
Dept: HOSPITAL 74 - YASAS | Age: 64
LOS: 4 days | Discharge: HOME | DRG: 773 | End: 2019-03-09
Attending: SURGERY | Admitting: SURGERY
Payer: COMMERCIAL

## 2019-03-05 VITALS — BODY MASS INDEX: 43.1 KG/M2

## 2019-03-05 DIAGNOSIS — G47.30: ICD-10-CM

## 2019-03-05 DIAGNOSIS — F19.24: ICD-10-CM

## 2019-03-05 DIAGNOSIS — R60.0: ICD-10-CM

## 2019-03-05 DIAGNOSIS — I10: ICD-10-CM

## 2019-03-05 DIAGNOSIS — N28.89: ICD-10-CM

## 2019-03-05 DIAGNOSIS — F11.23: Primary | ICD-10-CM

## 2019-03-05 DIAGNOSIS — B18.2: ICD-10-CM

## 2019-03-05 DIAGNOSIS — F17.210: ICD-10-CM

## 2019-03-05 DIAGNOSIS — E66.01: ICD-10-CM

## 2019-03-05 PROCEDURE — HZ2ZZZZ DETOXIFICATION SERVICES FOR SUBSTANCE ABUSE TREATMENT: ICD-10-PCS | Performed by: SURGERY

## 2019-03-05 RX ADMIN — Medication SCH MG: at 22:48

## 2019-03-05 RX ADMIN — Medication SCH: at 22:48

## 2019-03-05 RX ADMIN — CARVEDILOL SCH MG: 6.25 TABLET, FILM COATED ORAL at 22:48

## 2019-03-05 NOTE — EKG
Test Reason : 

Blood Pressure : ***/*** mmHG

Vent. Rate : 070 BPM     Atrial Rate : 070 BPM

   P-R Int : 152 ms          QRS Dur : 084 ms

    QT Int : 424 ms       P-R-T Axes : 043 012 031 degrees

   QTc Int : 457 ms

 

NORMAL SINUS RHYTHM

MINIMAL VOLTAGE CRITERIA FOR LVH, MAY BE NORMAL VARIANT

NONSPECIFIC T WAVE ABNORMALITY

ABNORMAL ECG

WHEN COMPARED WITH ECG OF 15-NOV-2018 07:46,

T WAVE INVERSION NO LONGER EVIDENT IN INFERIOR LEADS

Confirmed by MD Jackson, Donn ([id number]) on 3/5/2019 4:14:01 PM

 

Referred By:             Confirmed By:Donn Paz MD

## 2019-03-05 NOTE — PN
BHS Progress Note


Note: 





 Vital Signs











Temperature  97.6 F   03/05/19 12:32


 


Pulse Rate  86   03/05/19 12:32


 


Respiratory Rate  20   03/05/19 12:32


 


Blood Pressure  138/80   03/05/19 12:32


 


O2 Sat by Pulse Oximetry (%)      








patient evaluated on admission for white coloring of both lower extremities. 


Patient with edema +1 non-pitting both lower extremities, + small lesion on 

right LE, b/l hyperpigmentation , white scaling patches present on both lower 

extremities


possible chronic stasis dermatitis





low sodium diet 


elevate lower extremities 


bacitracin to lesion on right lower extremity 


eucerin cream as previously prescribed 


patient to follow primary care provider upon completing detox


will continue to monitor

## 2019-03-05 NOTE — HP
COWS





- Scale


Resting Pulse: 1= [place] 


Sweatin=Flushed/Facial Moisture


Restless Observation: 1= Difficult to Sit Still


Pupil Size: 0= Normal to Room Light


Bone or Joint Aches: 1= Mild Discomfort


Runny Nose/ Eye Tearin= Nasal Congestion


GI Upset > 30mins: 1= Stomach Cramp


Tremor Observation: 2= Slight Tremor Visible


Yawning Observation: 2= >3x During Session


Anxiety or Irritability: 2=Irritable/Anxious


Goose Flesh Skin: 0=Smooth Skin


COWS Score: 13





CIWA Score





- Admission Criteria


[facility] Guidelines: Admission for Medically Managed Detox: 


Requires at least one of the followin. CIWA greater than 12


2. Seizures within the past 24 hours


3. Delirium tremens within the past 24 hours


4. Hallucinations within the past 24 hours


5. Acute intervention needed for co  occurring medical disorder


6. Acute intervention needed for co  occurring psychiatric disorder


7. Severe withdrawal that cannot be handled at a lower level of care (continued


    vomiting, continued diarrhea, abnormal vital signs) requiring intravenous


    medication and/or fluids


8. Pregnancy








Admission ROS [facility]





- [facility]


Chief Complaint: 





I am here to detox and get sober. I need help.


Allergies/Adverse Reactions: 


 Allergies











Allergy/AdvReac Type Severity Reaction Status Date / Time


 


bee venom protein (honey bee) Allergy Severe Swelling Verified 18 15:04


 


penicillin G Allergy Severe Swelling Verified 18 15:04











History of Present Illness: 





pt is a 63yrold male with a history of heroin dependence seeking detox for 

treatment. Pt was sober for 10yrs and recently relapsed due to chronic pain to 

both legs especially the right one. 


pt had a h/o chest pain but has resolved. 


pt has a h/o HTN, chronic back pain, chronic leg pain, chronic B/L knee pain


Exam Limitations: No Limitations





- Ebola screening


Have you traveled outside of the country in the last 21 days: No


Have you had contact with anyone from an Ebola affected area: No


Have you been sick,other than usual withdrawal symptoms: No


Do you have a fever: No





- Review of Systems


Constitutional: Chills, Night Sweats, Changes in sleep, Weight Stable


EENT: reports: Nose Congestion


Respiratory: reports: No Symptoms reported


Cardiac: reports: No Symptoms Reported


GI: reports: Diarrhea, Abdominal cramping


: reports: No Symptoms Reported


Musculoskeletal: reports: Back Pain, Joint Pain, Muscle Pain


Integumentary: reports: Flushing, Sweating


Neuro: reports: Tingling, Tremors


Endocrine: reports: Flushing, Intolerance to Cold, Intolerance to Heat


Hematology: reports: No Symptoms Reported


Psychiatric: reports: Judgement Intact, Mood/Affect Appropiate, Orientated x3, 

Agitated, Anxious


Other Systems: Reviewed and Negative





Patient History





- Patient Medical History


Hx Anemia: No


Hx Asthma: No


Hx Chronic Obstructive Pulmonary Disease (COPD): No


Hx Cancer: No


Hx Cardiac Disorders: No


Hx Congestive Heart Failure: No


Hx Hypertension: Yes (On meds)


Hx Hypercholesterolemia: No


Hx Pacemaker: No


HX Cerebrovascular Accident: No


Hx Seizures: No


Hx Diabetes: No


Hx Gastrointestinal Disorders: No


Hx Liver Disease: Yes (HEPATITIS C)


Hx Genitourinary Disorders: No


Hx Sexually Transmitted Disorders: No


Hx Renal Disease (ESRD): No


Hx Thyroid Disease: No


Hx Human Immunodeficiency Virus (HIV): No (negative)


Hx Hepatitis C: Yes


Hx Depression: No


Hx Suicide Attempt: No (denies)


Hx Bipolar Disorder: No


Hx Schizophrenia: No





- Patient Surgical History


Past Surgical History: No


Hx Neurologic Surgery: No


Hx Cataract Extraction: No


Hx Cardiac Surgery: No


Hx Lung Surgery: No


Hx Breast Surgery: No


Hx Breast Biopsy: No


Hx Abdominal Surgery: No


Hx Appendectomy: No


Hx Cholecystectomy: No


Hx Genitourinary Surgery: No


Hx  Section: No


Hx Orthopedic Surgery: No


Anesthesia Reaction: No





- PPD History


Previous Implant?: Yes


Date: 04/15/18


Results: neg


PPD to be Administered?: No





- Reproductive History


Patient is a Female of Child Bearing Age (11 -55 yrs old): No





- Smoking Cessation


Smoking history: Current every day smoker


Have you smoked in the past 12 months: Yes


Aproximately how many cigarettes per day: 10


Hx Chewing Tobacco Use: No


Initiated information on smoking cessation: Yes


'Breaking Loose' booklet given: 19





- Substance & Tx. History


Hx Alcohol Use: No


Hx Substance Use: Yes


Substance Use Type: Heroin


Hx Substance Use Treatment: Yes (last detox 2019 [facility])





- Substances Abused


  ** Heroin


Route: Inhalation


Frequency: Daily


Amount used: 5-7 bags 


Age of first use: 19


Date of Last Use: 19





Family Disease History





- Family Disease History


Family Disease History: Diabetes: Grandparent, Mother, Other: Father





Admission Physical Exam [facility]





- Vital Signs


Vital Signs: 


 Vital Signs - 24 hr











  19





  12:32


 


Temperature 97.6 F


 


Pulse Rate 86


 


Respiratory 20





Rate 


 


Blood Pressure 138/80














- Physical


General Appearance: Yes: Appropriately Dressed, Moderate Distress, Obese, 

Tremorous, Irritable, Sweating, Anxious


HEENTM: Yes: Normal Voice, Rhinorrhea


Respiratory: Yes: Lungs Clear, Normal Breath Sounds, No Respiratory Distress


Neck: Yes: No masses,lesions,Nodules


Breast: Yes: Within Normal Limits


Cardiology: Yes: Regular Rhythm, Regular Rate, S1, S2


Abdominal: Yes: Normal Bowel Sounds, Non Tender, Soft


Genitourinary: Yes: Within Normal Limits


Back: Yes: Muscle Spasm


Musculoskeletal: Yes: Back pain, Joint Stiffness, Muscle Pain


Extremities: Yes: Normal Capillary Refill, Normal Inspection, Tremors


Neurological: Yes: Fully Oriented, Alert, Normal Response


Integumentary: Yes: Normal Color, Diaphoresis


Lymphatic: Yes: Within Normal Limits





- Diagnostic


(1) Left renal mass


Current Visit: No   Status: Suspected   





(2) Opioid dependence with withdrawal


Current Visit: Yes   Status: Chronic   





(3) Sleep apnea


Current Visit: Yes   Status: Chronic   


Qualifiers: 


   Sleep apnea type: unspecified type   Qualified Code(s): G47.30 - Sleep apnea

, unspecified   





(4) Substance induced mood disorder


Current Visit: No   Status: Acute   





(5) Chronic low back pain


Current Visit: Yes   Status: Chronic   


Qualifiers: 


   Back pain laterality: unspecified   Sciatica presence: unspecified whether 

sciatica present   Qualified Code(s): M54.5 - Low back pain; G89.29 - Other 

chronic pain; G89.29 - Other chronic pain   





(6) Essential hypertension


Current Visit: Yes   Status: Chronic   





(7) Hepatitis C


Current Visit: Yes   Status: Chronic   


Qualifiers: 


   Viral hepatitis chronicity: chronic   Hepatic coma status: without hepatic 

coma   Qualified Code(s): B18.2 - Chronic viral hepatitis C   





(8) Nicotine dependence


Current Visit: Yes   Status: Chronic   


Qualifiers: 


   Nicotine product type: cigarettes   Substance use status: uncomplicated   

Qualified Code(s): F17.210 - Nicotine dependence, cigarettes, uncomplicated   





(9) Obesity


Current Visit: Yes   Status: Chronic   


Qualifiers: 


   Obesity type: due to excess calories   Obesity classification: adult class 3 

(BMI >= 40)   Serious obesity comorbidity presence: without serious comorbidity

   Body mass index: BMI 45.0-49.9   Qualified Code(s): E66.01 - Morbid (severe) 

obesity due to excess calories; Z68.42 - Body mass index (BMI) 45.0-49.9, adult

; Z68.42 - Body mass index (BMI) 45.0-49.9, adult; Z68.42 - Body mass index (BMI

) 45.0-49.9, adult; Z68.42 - Body mass index (BMI) 45.0-49.9, adult   





Cleared for Admission [facility]





- Detox or Rehab


[facility] Level of Care: Medically Managed


Detox Regimen/Protocol: Methadone





[facility] Breath Alcohol Content


Breath Alcohol Content: 0





Urine Drug Screen





- Results


Drug Screen Negative: No


Urine Drug Screen Results: OPI-Opiates





Inpatient Rehab Admission





- Rehab Decision to Admit


Inpatient rehab admission?: No

## 2019-03-06 LAB
ALBUMIN SERPL-MCNC: 3.8 G/DL (ref 3.4–5)
ALP SERPL-CCNC: 95 U/L (ref 45–117)
ALT SERPL-CCNC: 28 U/L (ref 13–61)
ANION GAP SERPL CALC-SCNC: 8 MMOL/L (ref 8–16)
AST SERPL-CCNC: 31 U/L (ref 15–37)
BILIRUB SERPL-MCNC: 0.7 MG/DL (ref 0.2–1)
BUN SERPL-MCNC: 19 MG/DL (ref 7–18)
CALCIUM SERPL-MCNC: 9.1 MG/DL (ref 8.5–10.1)
CHLORIDE SERPL-SCNC: 104 MMOL/L (ref 98–107)
CO2 SERPL-SCNC: 29 MMOL/L (ref 21–32)
CREAT SERPL-MCNC: 1.3 MG/DL (ref 0.55–1.3)
DEPRECATED RDW RBC AUTO: 15.4 % (ref 11.9–15.9)
GLUCOSE SERPL-MCNC: 107 MG/DL (ref 74–106)
HCT VFR BLD CALC: 44.1 % (ref 35.4–49)
HGB BLD-MCNC: 14.8 GM/DL (ref 11.7–16.9)
MCH RBC QN AUTO: 28.5 PG (ref 25.7–33.7)
MCHC RBC AUTO-ENTMCNC: 33.7 G/DL (ref 32–35.9)
MCV RBC: 84.8 FL (ref 80–96)
PLATELET # BLD AUTO: 155 K/MM3 (ref 134–434)
PMV BLD: 8.3 FL (ref 7.5–11.1)
POTASSIUM SERPLBLD-SCNC: 4.5 MMOL/L (ref 3.5–5.1)
PROT SERPL-MCNC: 7.4 G/DL (ref 6.4–8.2)
RBC # BLD AUTO: 5.2 M/MM3 (ref 4–5.6)
SODIUM SERPL-SCNC: 141 MMOL/L (ref 136–145)
WBC # BLD AUTO: 6 K/MM3 (ref 4–10)

## 2019-03-06 RX ADMIN — FLUOCINONIDE SCH APPLIC: 0.5 OINTMENT TOPICAL at 14:34

## 2019-03-06 RX ADMIN — Medication SCH TAB: at 12:44

## 2019-03-06 RX ADMIN — Medication SCH: at 22:43

## 2019-03-06 RX ADMIN — AMLODIPINE BESYLATE SCH MG: 10 TABLET ORAL at 12:43

## 2019-03-06 RX ADMIN — BACITRACIN ZINC SCH GM: 500 OINTMENT TOPICAL at 12:43

## 2019-03-06 RX ADMIN — Medication SCH: at 16:03

## 2019-03-06 RX ADMIN — CARVEDILOL SCH MG: 6.25 TABLET, FILM COATED ORAL at 12:43

## 2019-03-06 RX ADMIN — HYDROCHLOROTHIAZIDE SCH: 12.5 CAPSULE ORAL at 08:56

## 2019-03-06 RX ADMIN — FLUOCINONIDE SCH APPLIC: 0.5 OINTMENT TOPICAL at 19:10

## 2019-03-06 RX ADMIN — Medication SCH: at 22:44

## 2019-03-06 RX ADMIN — NICOTINE SCH MG: 21 PATCH TRANSDERMAL at 12:43

## 2019-03-06 RX ADMIN — HYDROXYZINE PAMOATE PRN MG: 50 CAPSULE ORAL at 17:42

## 2019-03-06 RX ADMIN — CARVEDILOL SCH MG: 6.25 TABLET, FILM COATED ORAL at 22:42

## 2019-03-06 RX ADMIN — FLUOCINONIDE SCH APPLIC: 0.5 OINTMENT TOPICAL at 22:43

## 2019-03-06 RX ADMIN — HYDROCHLOROTHIAZIDE SCH MG: 12.5 CAPSULE ORAL at 12:43

## 2019-03-06 NOTE — PN
BHS COWS





- Scale


Resting Pulse: 0= [place] 80 or Below


Sweatin=Flushed/Facial Moisture


Restless Observation: 1= Difficult to Sit Still


Pupil Size: 0= Normal to Room Light


Bone or Joint Aches: 2= Severe Diffuse Aches


Runny Nose/ Eye Tearin= Runny Nose/Eyes


GI Upset > 30mins: 3= Vomiting/Diarrhea


Tremor Observation of Outstretched Hands: 2= Slight Tremor Visible


Yawning Observation: 2= >3x During Session


Anxiety or Irritability: 2=Irritable/Anxious


Goose Flesh Skin: 3=Piloerection


COWS Score: 19





BHS Progress Note (SOAP)


Subjective: 





nausea


vomiting


body aches


interrupted sleep


agitation


sweats


shakes


Objective: 





19 10:54


 Vital Signs











Temperature  98.9 F   19 09:23


 


Pulse Rate  62   19 09:23


 


Respiratory Rate  20   19 09:23


 


Blood Pressure  172/98 H  19 09:23


 


O2 Sat by Pulse Oximetry (%)      








 Laboratory Tests











  19





  06:00


 


WBC  6.0


 


RBC  5.20


 


Hgb  14.8


 


Hct  44.1


 


MCV  84.8


 


MCH  28.5


 


MCHC  33.7


 


RDW  15.4


 


Plt Count  155


 


MPV  8.3  D








rest of labs pending


aaox3


ambulating


no acute distress


excessive dry/flaky skin to lower legs and elbows appears like plaque psoriasis





Assessment: 





19 11:02


withdrawal sx


Plan: 





continue detox


increase fluids


lidex ointment ordered to use with eucerin ointment


tigan IM x one

## 2019-03-07 RX ADMIN — CARVEDILOL SCH MG: 6.25 TABLET, FILM COATED ORAL at 23:00

## 2019-03-07 RX ADMIN — FLUOCINONIDE SCH APPLIC: 0.5 OINTMENT TOPICAL at 18:26

## 2019-03-07 RX ADMIN — CARVEDILOL SCH MG: 6.25 TABLET, FILM COATED ORAL at 10:22

## 2019-03-07 RX ADMIN — FLUOCINONIDE SCH APPLIC: 0.5 OINTMENT TOPICAL at 22:58

## 2019-03-07 RX ADMIN — HYDROCHLOROTHIAZIDE SCH MG: 12.5 CAPSULE ORAL at 10:22

## 2019-03-07 RX ADMIN — Medication SCH MG: at 22:58

## 2019-03-07 RX ADMIN — FLUOCINONIDE SCH APPLIC: 0.5 OINTMENT TOPICAL at 11:29

## 2019-03-07 RX ADMIN — BACITRACIN ZINC SCH GM: 500 OINTMENT TOPICAL at 10:22

## 2019-03-07 RX ADMIN — Medication SCH APPLIC: at 10:22

## 2019-03-07 RX ADMIN — AMLODIPINE BESYLATE SCH MG: 10 TABLET ORAL at 10:22

## 2019-03-07 RX ADMIN — NICOTINE SCH MG: 21 PATCH TRANSDERMAL at 10:23

## 2019-03-07 RX ADMIN — Medication SCH TAB: at 10:22

## 2019-03-07 RX ADMIN — Medication SCH APPLIC: at 22:59

## 2019-03-07 RX ADMIN — FLUOCINONIDE SCH APPLIC: 0.5 OINTMENT TOPICAL at 14:24

## 2019-03-07 NOTE — PN
[facility] Progress Note


Note: 





2pm V/S 


 Vital Signs











Temperature  98.4 F   03/07/19 13:53


 


Pulse Rate  87   03/07/19 13:53


 


Respiratory Rate  20   03/07/19 13:53


 


Blood Pressure  183/98 H  03/07/19 13:53


 


O2 Sat by Pulse Oximetry (%)      








lisinopril 40mg x one ordered


continue to monitor BP

## 2019-03-07 NOTE — PN
[facility]HS Progress Note


Note: 





 Vital Signs - 24 hr











  03/06/19 03/06/19 03/06/19





  18:24 18:36 21:35


 


Temperature 96.9 F L  96 F L


 


Pulse Rate 77 89 80


 


Respiratory 18 18 18





Rate   


 


Blood Pressure 195/108 H 117/63 151/87














  03/07/19 03/07/19 03/07/19





  07:05 09:59 13:53


 


Temperature 97.9 F 97.7 F 98.4 F


 


Pulse Rate 79 79 87


 


Respiratory 20 20 20





Rate   


 


Blood Pressure 183/88 H 183/93 H 183/98 H














  03/07/19





  17:34


 


Temperature 97.9 F


 


Pulse Rate 84


 


Respiratory 20





Rate 


 


Blood Pressure 175/71 H








Patient c/o of fatigue attributes to withdrawal sx 


BP elevated, as per patient BP increases when going through withdrawal 


Patient denies headaches, chest pain, SOB, paresthesia, c/o of cough x 4 days 





Patient Aox3, no acute distress 


EENT WNL


Lungs clear, + rhonchi 


s1 s2 present no JVD 


skin intact, no edema 





elevated BP secondary to withdrawal 





increase PO fluids 


one time order of HCTZ 25 mg ordered


continue to monitor


check BP in an hour

## 2019-03-08 ENCOUNTER — HOSPITAL ENCOUNTER (EMERGENCY)
Dept: HOSPITAL 74 - JER | Age: 64
Discharge: HOME | End: 2019-03-08
Payer: COMMERCIAL

## 2019-03-08 VITALS — TEMPERATURE: 97.8 F | SYSTOLIC BLOOD PRESSURE: 168 MMHG | DIASTOLIC BLOOD PRESSURE: 98 MMHG | HEART RATE: 92 BPM

## 2019-03-08 VITALS — BODY MASS INDEX: 42.8 KG/M2

## 2019-03-08 DIAGNOSIS — F17.210: Primary | ICD-10-CM

## 2019-03-08 DIAGNOSIS — I10: ICD-10-CM

## 2019-03-08 DIAGNOSIS — B19.20: ICD-10-CM

## 2019-03-08 LAB
ALBUMIN SERPL-MCNC: 3.4 G/DL (ref 3.4–5)
ALP SERPL-CCNC: 80 U/L (ref 45–117)
ALT SERPL-CCNC: 21 U/L (ref 13–61)
ANION GAP SERPL CALC-SCNC: 10 MMOL/L (ref 8–16)
AST SERPL-CCNC: 20 U/L (ref 15–37)
BASOPHILS # BLD: 0.8 % (ref 0–2)
BILIRUB SERPL-MCNC: 1.4 MG/DL (ref 0.2–1)
BUN SERPL-MCNC: 20 MG/DL (ref 7–18)
CALCIUM SERPL-MCNC: 8.3 MG/DL (ref 8.5–10.1)
CHLORIDE SERPL-SCNC: 97 MMOL/L (ref 98–107)
CO2 SERPL-SCNC: 28 MMOL/L (ref 21–32)
CREAT SERPL-MCNC: 1.3 MG/DL (ref 0.55–1.3)
DEPRECATED RDW RBC AUTO: 15.7 % (ref 11.9–15.9)
EOSINOPHIL # BLD: 0.3 % (ref 0–4.5)
GLUCOSE SERPL-MCNC: 84 MG/DL (ref 74–106)
HCT VFR BLD CALC: 56.1 % (ref 35.4–49)
HGB BLD-MCNC: 19.5 GM/DL (ref 11.7–16.9)
LYMPHOCYTES # BLD: 26.3 % (ref 8–40)
MAGNESIUM SERPL-MCNC: 1.8 MG/DL (ref 1.8–2.4)
MCH RBC QN AUTO: 28.2 PG (ref 25.7–33.7)
MCHC RBC AUTO-ENTMCNC: 34.7 G/DL (ref 32–35.9)
MCV RBC: 81.1 FL (ref 80–96)
MONOCYTES # BLD AUTO: 14.4 % (ref 3.8–10.2)
NEUTROPHILS # BLD: 58.2 % (ref 42.8–82.8)
PLATELET # BLD AUTO: 210 K/MM3 (ref 134–434)
PMV BLD: 7.7 FL (ref 7.5–11.1)
POTASSIUM SERPLBLD-SCNC: 3 MMOL/L (ref 3.5–5.1)
PROT SERPL-MCNC: 6.9 G/DL (ref 6.4–8.2)
RBC # BLD AUTO: 6.91 M/MM3 (ref 4–5.6)
SODIUM SERPL-SCNC: 135 MMOL/L (ref 136–145)
WBC # BLD AUTO: 9.9 K/MM3 (ref 4–10)

## 2019-03-08 PROCEDURE — 3E033GC INTRODUCTION OF OTHER THERAPEUTIC SUBSTANCE INTO PERIPHERAL VEIN, PERCUTANEOUS APPROACH: ICD-10-PCS

## 2019-03-08 PROCEDURE — 3E0337Z INTRODUCTION OF ELECTROLYTIC AND WATER BALANCE SUBSTANCE INTO PERIPHERAL VEIN, PERCUTANEOUS APPROACH: ICD-10-PCS

## 2019-03-08 RX ADMIN — FLUOCINONIDE SCH: 0.5 OINTMENT TOPICAL at 13:55

## 2019-03-08 RX ADMIN — CARVEDILOL SCH MG: 6.25 TABLET, FILM COATED ORAL at 23:52

## 2019-03-08 RX ADMIN — Medication SCH: at 11:38

## 2019-03-08 RX ADMIN — FLUOCINONIDE SCH: 0.5 OINTMENT TOPICAL at 11:39

## 2019-03-08 RX ADMIN — HYDROXYZINE PAMOATE PRN MG: 50 CAPSULE ORAL at 00:27

## 2019-03-08 RX ADMIN — CARVEDILOL SCH: 6.25 TABLET, FILM COATED ORAL at 11:38

## 2019-03-08 RX ADMIN — NICOTINE SCH: 21 PATCH TRANSDERMAL at 11:39

## 2019-03-08 RX ADMIN — Medication SCH TAB: at 11:39

## 2019-03-08 RX ADMIN — AMLODIPINE BESYLATE SCH: 10 TABLET ORAL at 11:39

## 2019-03-08 RX ADMIN — Medication SCH MG: at 23:52

## 2019-03-08 RX ADMIN — HYDROCHLOROTHIAZIDE SCH: 12.5 CAPSULE ORAL at 11:39

## 2019-03-08 RX ADMIN — FLUOCINONIDE SCH APPLIC: 0.5 OINTMENT TOPICAL at 23:51

## 2019-03-08 RX ADMIN — BACITRACIN ZINC SCH: 500 OINTMENT TOPICAL at 11:38

## 2019-03-08 RX ADMIN — FLUOCINONIDE SCH: 0.5 OINTMENT TOPICAL at 23:56

## 2019-03-08 RX ADMIN — Medication SCH: at 23:52

## 2019-03-08 NOTE — PN
[facility]HS Progress Note


Note: 





pt appears very sickly, lethargic looking, pt states he feels so weak no 

appetite.


pt was assessed, lethargic looking, pt could open his eyes unless he forced 

them open.


pt agreed to go to our main hospital for evaluation.


report given to  Dr. Bunch at [facility]

## 2019-03-08 NOTE — PDOC
History of Present Illness





- General


Chief Complaint: Weakness


Stated Complaint: HTN


Time Seen by Provider: 03/08/19 12:04





- History of Present Illness


Initial Comments: 





03/08/19 12:04


62 yo male with PMH Heroin dependence, HTN presents from [facility] with 

complaint of weakness for 3 days. He states he presented to Detox for heroin 

use on monday and was treated with a Methadone taper which he completed 

yesterday (thursday). He states that he has been feeling lightheaded on 

exertion and feeling like he was going to pass out when ambulating the 

bathroom. He also endorses nausea and vomiting since yesterday which he states 

is nonbloody. He states he has felt dehydrated the last few days despite 

drinking water. He describes the feeling as similar to one time when he went on 

a trip and ran out of heroin. He states this is how his withdrawals normally 

present, however this has been worse than in the past. 








Past History





- Past Medical History


Allergies/Adverse Reactions: 


 Allergies











Allergy/AdvReac Type Severity Reaction Status Date / Time


 


bee venom protein (honey bee) Allergy Severe Swelling Verified 03/05/19 13:40


 


penicillin G Allergy Severe Swelling Verified 03/05/19 13:40











Home Medications: 


Ambulatory Orders





Amlodipine Besylate [Norvasc -] 10 mg PO DAILY #0 tablet 11/19/18 


Carvedilol [Coreg -] 6.25 mg PO BID #60 tablet 11/19/18 


Hydrochlorothiazide [Hctz -] 12.5 mg PO DAILY 03/05/19 








Anemia: No


Asthma: No


Cancer: No


Cardiac Disorders: No


CVA: No


COPD: No


CHF: No


Diabetes: No


GI Disorders: No


 Disorders: No


HTN: Yes


Hypercholesterolemia: No


Kidney Stones: No


Liver Disease: Yes (HEPATITIS C)


Seizures: No


Thyroid Disease: No


Other medical history: SUBSTANCE ABUSE





- Surgical History


Abdominal Surgery: No


Appendectomy: No


Cardiac Surgery: No


Cholecystectomy: No


Lung Surgery: No


Neurologic Surgery: No


Orthopedic Surgery: No





- Reproductive History


Testicular Surgery: No





- Immunization History


Immunization Up to Date:  (UNKNOWN)





- Suicide/Smoking/Psychosocial Hx


Smoking History: Current every day smoker


Have you smoked in the past 12 months: Yes


Number of Cigarettes Smoked Daily: 10


Information on smoking cessation initiated: No


'Breaking Loose' booklet given: 03/05/19


Hx Alcohol Use: No


Drug/Substance Use Hx: Yes (HEROIN)


Substance Use Type: Heroin


Hx Substance Use Treatment: Yes





**Review of Systems





- Review of Systems


Constitutional: No: Chills, Fever


HEENTM: No: Blurred Vision


Respiratory: No: Cough, Shortness of Breath


Cardiac (ROS): No: Chest Pain, Irregular Heart Rate


ABD/GI: Yes: Diarrhea, Nausea, Vomiting.  No: Abdominal Distended


: No: Burning, Dysuria, Discharge


Musculoskeletal: No: Back Pain, Joint Pain





*Physical Exam





- Vital Signs


 Last Vital Signs











Temp Pulse Resp BP Pulse Ox


 


 97.8 F   92 H  18   168/98   98 


 


 03/08/19 10:59  03/08/19 10:59  03/08/19 10:59  03/08/19 10:59  03/08/19 10:59














- Physical Exam


Comments: 





03/08/19 12:05


GEN: Oriented time 3, somewhat lethargic which patient attributes to not 

sleeping the last few days. 


HEENT: Dry mucus membranes


NECK: Supple


HEART: RRR, no murmurs noted


LUNGS: CTA b/l


ABDOMEN: normoactive bowel sounds, soft, nontender


EXTREMITIES: 1+ LE edema, chronic venous stasis changes noted. diffuse scarring 

and tract marks throughout UEs b/l











Moderate Sedation





- Procedure Monitoring


Vital Signs: 


Procedure Monitoring Vital Signs











Temperature  97.8 F   03/08/19 10:59


 


Pulse Rate  92 H  03/08/19 10:59


 


Respiratory Rate  18   03/08/19 10:59


 


Blood Pressure  168/98   03/08/19 10:59


 


O2 Sat by Pulse Oximetry (%)  98   03/08/19 10:59











ED Treatment Course





- LABORATORY


CBC & Chemistry Diagram: 


 03/08/19 14:30





 03/08/19 14:30





Medical Decision Making





- Medical Decision Making





03/08/19 12:34


62 yo male presents from [facility] (heroin) for complaint of 

lightheadedness, nausea, vomiting. Pt endorses he completed methadone taper 

yesterday.





CBC, CMP, Mg, Trop, EKG pending. Pt is difficult IV stick/blood draw with 

extensive history of heroin injection, labs yet to be drawn at this time.





03/08/19 12:44


Upon further review of chart, pt still on methadone taper, did not receive dose 

this AM, however did receive 10mg Valium a couple hours prior to transfer to 

hospital. 





03/08/19 14:18


Pt with complaint of nausea at this time. will give SL Zofran pending EKG and 

QTc okay





03/08/19 17:24


CBC noted with significantly concentrated H/H compared with labs from 2 days 

ago. 1L fluid bolus given slowly with frequent reassessment considering past 

stress test with EF 25%. 


Pt states he is feeling better than when he initially presented. Will assess 

ambulatory status and likely d/c return to [facility].


CMP notable for hypokalemia and hypomagnesemia, will replete orally prior to d/c

## 2019-03-08 NOTE — PDOC
Attending Attestation





- Resident


Resident Name: Zak Morales





- ED Attending Attestation


I have performed the following: I have examined & evaluated the patient, The 

case was reviewed & discussed with the resident, I agree w/resident's findings 

& plan, Exceptions are as noted





- HPI


HPI: 





03/08/19 14:10


The patient is a 63 year old male, with a significant PMH of heroin dependence 

and hypertension, who presents to the emergency department from [facility] 

with 3 days of generalized weakness. The patient states he went to [facility] 

for heroin detox beginning on Monday and completed a Methadone taper yesterday (

Thursday). The patient endorses lightheadedness stating he feels like he may 

pass out while walking. The patient also endorses nausea and vomiting (non 

bloody non bilious) since yesterday and feeling dehydrated. The patient reports 

his symptom are similar to past withdrawals but states this time is worse than 

usual. 





The patient denies chest pain, shortness of breath, headache. Denies focal 

weakness/numnbess


Denies fever, chills, diarrhea and constipation. Denies abd pain


Denies dysuria, frequency, urgency and hematuria.





Allergies: bee venom protein (honey bee), penicillin G 








- Physicial Exam


PE: 





03/08/19 14:10


agree with resident exam 





- Medical Decision Making





03/08/19 14:11


64yo M hx HTN and heroin abuse presents to the ED with generalized weakness, N/V

/D. Vitals with elevated BP, otherwise wnl. Exam wnl. DDx includes but not 

limited to dehydration 2/2 withdrawal vs electrolyte abnormality vs anemia. Plan


-labs


-IVF


-EKG


-reassess





03/08/19 17:31


EKG unchanged


Pt gently hydrated with 1L NS (last EF 25%)


Feels much better


Hgb 19, consistent with hemoconcentration. Likely dehydrated 2/2 insensible 

loses from heroin withdrawal


K 3.0


Plan to replete K, reassess pt


If continues to improve, can be discharged back to [facility].





<Artie Aquino - Last Filed: 03/08/19 17:33>





**Heart Score/ECG Review


  ** #1





03/08/19 14:17


Twelve-lead EKG was performed and reviewed by me. Normal sinus rhythm, rate 92. 

Normal axis. No ST elevations. No sig changes compared to EKG from 3/5/19





<Artie Aquino - Last Filed: 03/08/19 17:33>





Attestations





- Attestations





03/08/19 14:25





Documentation prepared by Alfie Daugherty, acting as medical scribe for Artie Aquino MD.





<Alfie Daugherty - Last Filed: 03/08/19 14:25>

## 2019-03-09 VITALS — SYSTOLIC BLOOD PRESSURE: 137 MMHG | TEMPERATURE: 97.7 F | HEART RATE: 83 BPM | DIASTOLIC BLOOD PRESSURE: 85 MMHG

## 2019-03-09 RX ADMIN — BACITRACIN ZINC SCH GM: 500 OINTMENT TOPICAL at 10:17

## 2019-03-09 RX ADMIN — CARVEDILOL SCH MG: 6.25 TABLET, FILM COATED ORAL at 10:17

## 2019-03-09 RX ADMIN — Medication SCH: at 10:17

## 2019-03-09 RX ADMIN — HYDROCHLOROTHIAZIDE SCH MG: 12.5 CAPSULE ORAL at 10:17

## 2019-03-09 RX ADMIN — AMLODIPINE BESYLATE SCH MG: 10 TABLET ORAL at 10:17

## 2019-03-09 RX ADMIN — Medication SCH TAB: at 10:17

## 2019-03-09 NOTE — DS
[facility] Detox Discharge Summary


Admission Date: 


03/05/19





Discharge Date: 03/09/19





- History


Present History: Opioid Dependence





- Physical Exam Results


Vital Signs: 


 Vital Signs











Temperature  97.7 F   03/09/19 11:06


 


Pulse Rate  83   03/09/19 11:06


 


Respiratory Rate  18   03/09/19 11:06


 


Blood Pressure  137/85   03/09/19 11:06


 


O2 Sat by Pulse Oximetry (%)      














- Treatment


Hospital Course: Detox Protocol Followed, Detoxed Safely, Responded well, 

Discharged Condition Good, Rehab Referral Accepted





- Medication


Discharge Medications: 


Ambulatory Orders





Amlodipine Besylate [Norvasc -] 10 mg PO DAILY #0 tablet 11/19/18 


Carvedilol [Coreg -] 6.25 mg PO BID #60 tablet 11/19/18 


Hydrochlorothiazide [Hctz -] 12.5 mg PO DAILY 03/05/19 











- Diagnosis


(1) Left renal mass


Current Visit: No   Status: Suspected   





(2) Opioid dependence with withdrawal


Current Visit: Yes   Status: Chronic   





(3) Sleep apnea


Current Visit: Yes   Status: Chronic   


Qualifiers: 


   Sleep apnea type: unspecified type   Qualified Code(s): G47.30 - Sleep apnea

, unspecified   





(4) Substance induced mood disorder


Current Visit: No   Status: Acute   





(5) Chronic low back pain


Current Visit: Yes   Status: Chronic   


Qualifiers: 


   Back pain laterality: unspecified   Sciatica presence: unspecified whether 

sciatica present   Qualified Code(s): M54.5 - Low back pain; G89.29 - Other 

chronic pain; G89.29 - Other chronic pain   





(6) Essential hypertension


Current Visit: Yes   Status: Chronic   





(7) Hepatitis C


Current Visit: Yes   Status: Chronic   


Qualifiers: 


   Viral hepatitis chronicity: chronic   Hepatic coma status: without hepatic 

coma   Qualified Code(s): B18.2 - Chronic viral hepatitis C   





(8) Nicotine dependence


Current Visit: Yes   Status: Chronic   


Qualifiers: 


   Nicotine product type: cigarettes   Substance use status: uncomplicated   

Qualified Code(s): F17.210 - Nicotine dependence, cigarettes, uncomplicated   





(9) Obesity


Current Visit: Yes   Status: Chronic   


Qualifiers: 


   Obesity type: due to excess calories   Obesity classification: adult class 3 

(BMI >= 40)   Serious obesity comorbidity presence: without serious comorbidity

   Body mass index: BMI 45.0-49.9   Qualified Code(s): E66.01 - Morbid (severe) 

obesity due to excess calories; Z68.42 - Body mass index (BMI) 45.0-49.9, adult

; Z68.42 - Body mass index (BMI) 45.0-49.9, adult; Z68.42 - Body mass index (BMI

) 45.0-49.9, adult; Z68.42 - Body mass index (BMI) 45.0-49.9, adult   





- AMA


Did Patient Leave Against Medical Advice: No (going home/ going to work)

## 2019-03-09 NOTE — PN
[facility] Progress Note (SOAP)


Subjective: 





pt states he is feeling much better and is going to work.


Objective: 





03/09/19 09:47


 Vital Signs











Temperature  97.3 F L  03/09/19 06:00


 


Pulse Rate  76   03/09/19 06:00


 


Respiratory Rate  20   03/09/19 06:00


 


Blood Pressure  139/77   03/09/19 06:00


 


O2 Sat by Pulse Oximetry (%)      








[id number]


ambulating


no acute distress








Assessment: 





03/09/19 09:48


no s/s of withdrawal sx 








Plan: 





d/c today

## 2019-03-10 NOTE — EKG
Test Reason : 

Blood Pressure : ***/*** mmHG

Vent. Rate : 092 BPM     Atrial Rate : 092 BPM

   P-R Int : 130 ms          QRS Dur : 088 ms

    QT Int : 362 ms       P-R-T Axes : 034 -07 121 degrees

   QTc Int : 447 ms

 

NORMAL SINUS RHYTHM

LEFT VENTRICULAR HYPERTROPHY WITH REPOLARIZATION ABNORMALITY

ABNORMAL ECG

WHEN COMPARED WITH ECG OF 05-MAR-2019 16:02,

NO SIGNIFICANT CHANGE WAS FOUND

Confirmed by DEVENDRA AUSTIN, MIGUELITO ([id number]) on 3/10/2019 11:56:37 AM

 

Referred By:             Confirmed By:MIGUELITO RAMSAY MD

## 2021-06-18 NOTE — ECHO
Attempted call again spoke, supervisor is looking into situation with home draw.  He will check with compliance and update the clinic if needed.   Writer advised patient will likely need labs every 3-6 months per normal provider protocol, not just once   If supervisor has further needs from the office he will contact clinic.    ______________________________________________________________________________



Name: NADIYA LEGER                                Exam:Adult Echocardiogram

MRN: [id number]         Study Date: 11/16/2018 07:17 AM

Age: 63 yrs

______________________________________________________________________________



Reason For Study: SOB

Height: 69 in        Weight: 300 lb        BSA: 2.5 m2



______________________________________________________________________________



MMode/2D Measurements & Calculations

IVSd: 0.85 cm                                          Ao root diam: 3.3 cm

LVIDd: 5.0 cm                                          LA dimension: 3.6 cm

LVIDs: 4.8 cm

LVPWd: 1.2 cm



________________________________________________________

EDV(Teich): 119.3 ml                                   LVOT diam: 2.1 cm

ESV(Teich): 108.3 ml



Doppler Measurements & Calculations

Med Peak E' Adrián: 7.3 cm/sec

Lat Peak E' Adrián: 3.4 cm/sec





______________________________________________________________________________

Procedure

The study was technically difficult with many images being suboptimal in quality.

Left Ventricle

Although wall motion is not well seen, the inferior wall and inferolateral wall appear severely hypok
inetic in

multiple views. The remaining walls appear moderately hypokinetic. Overall, LVEF appears to be modera
te to

severely reduced. The transmitral spectral Doppler flow pattern is suggestive of impaired LV relaxati
on.

Right Ventricle

The right ventricle is not well visualized.

Atria

Normal left and right atrial size and function.

Mitral Valve

The mitral valve is normal in structure and function. There is no mitral valve stenosis. There is no 
mitral

regurgitation noted.

Tricuspid Valve

The tricuspid valve is not well visualized, but is grossly normal. There is mild tricuspid regurgitat
ion.

Aortic Valve

There is moderate aortic sclerosis.;. No hemodynamically significant valvular aortic stenosis. No aor
tic

regurgitation is present.

Pulmonic Valve

The pulmonic valve is not well seen, but is grossly normal. There is no pulmonic valvular stenosis. T
here is

no pulmonic valvular regurgitation.

Great Vessels

The aortic root is normal size.

Pericardium/Pleura

There is no pericardial effusion.



______________________________________________________________________________



Interpretation Summary

Suggest repeating study with contrast as outpatient for better assessment of wall motion and more pre
cise

evaluation of LVEF. The study was technically difficult with many images being suboptimal in quality.


Although wall motion is not well seen, the inferior wall and inferolateral wall appear severely hypok
inetic in

multiple views. The remaining walls appear moderately hypokinetic. Overall, LVEF appears to be modera
te to

severely reduced.

There is mild tricuspid regurgitation.

There is moderate aortic sclerosis.;

There is no pericardial effusion.







MD Yohan Uriarte 11/16/2018 09:06 AM